# Patient Record
Sex: MALE | Race: WHITE | NOT HISPANIC OR LATINO | Employment: FULL TIME | URBAN - METROPOLITAN AREA
[De-identification: names, ages, dates, MRNs, and addresses within clinical notes are randomized per-mention and may not be internally consistent; named-entity substitution may affect disease eponyms.]

---

## 2019-02-08 ENCOUNTER — TELEPHONE (OUTPATIENT)
Dept: SCHEDULING | Facility: IMAGING CENTER | Age: 59
End: 2019-02-08

## 2019-02-15 ENCOUNTER — OFFICE VISIT (OUTPATIENT)
Dept: URGENT CARE | Facility: CLINIC | Age: 59
End: 2019-02-15
Payer: COMMERCIAL

## 2019-02-15 ENCOUNTER — APPOINTMENT (OUTPATIENT)
Dept: RADIOLOGY | Facility: IMAGING CENTER | Age: 59
End: 2019-02-15
Attending: INTERNAL MEDICINE
Payer: COMMERCIAL

## 2019-02-15 VITALS
SYSTOLIC BLOOD PRESSURE: 136 MMHG | WEIGHT: 260 LBS | HEIGHT: 75 IN | RESPIRATION RATE: 16 BRPM | HEART RATE: 75 BPM | DIASTOLIC BLOOD PRESSURE: 84 MMHG | OXYGEN SATURATION: 95 % | TEMPERATURE: 97.8 F | BODY MASS INDEX: 32.33 KG/M2

## 2019-02-15 DIAGNOSIS — M25.552 ACUTE HIP PAIN, LEFT: ICD-10-CM

## 2019-02-15 DIAGNOSIS — M54.50 LUMBAR BACK PAIN: ICD-10-CM
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
## 2019-02-15 DIAGNOSIS — M89.9 LYTIC BONE LESION OF LEFT FEMUR: ICD-10-CM

## 2019-02-15 PROCEDURE — 99204 OFFICE O/P NEW MOD 45 MIN: CPT | Performed by: INTERNAL MEDICINE

## 2019-02-15 PROCEDURE — 73502 X-RAY EXAM HIP UNI 2-3 VIEWS: CPT | Mod: LT | Performed by: INTERNAL MEDICINE

## 2019-02-15 PROCEDURE — 72100 X-RAY EXAM L-S SPINE 2/3 VWS: CPT | Performed by: INTERNAL MEDICINE

## 2019-02-15 RX ORDER — LORAZEPAM 1 MG/1
1 TABLET ORAL 2 TIMES DAILY PRN
COMMUNITY
End: 2019-03-13

## 2019-02-15 ASSESSMENT — PAIN SCALES - GENERAL: PAINLEVEL: 8=MODERATE-SEVERE PAIN

## 2019-02-15 NOTE — LETTER
February 15, 2019       Patient: Sergio Lakhani   YOB: 1960   Date of Visit: 2/15/2019         To Whom It May Concern:    It is my medical opinion that Sergio Lakhani remain out of work until 2/18/19.    If you have any questions or concerns, please don't hesitate to call [phone]          Sincerely,          Jean-Claude Lopez M.D.  Electronically Signed

## 2019-02-16 ASSESSMENT — ENCOUNTER SYMPTOMS
CONSTITUTIONAL NEGATIVE: 1
COUGH: 0
HEADACHES: 0
FALLS: 1
SHORTNESS OF BREATH: 0
FEVER: 0
BACK PAIN: 1
NAUSEA: 0
EYES NEGATIVE: 1
CHILLS: 0
MYALGIAS: 0
SORE THROAT: 0
BLOOD IN STOOL: 0
DIARRHEA: 0
WEIGHT LOSS: 0
DIZZINESS: 0
VOMITING: 0
PALPITATIONS: 0

## 2019-02-17 NOTE — PROGRESS NOTES
Sergio Lakhani is a 58 y.o. male who presents for Back Pain (fell on ice, pt has arthitis, nerve issues in the lower back, x 3 days )       Patient is a 58-year-old male who presents today after sustaining a fall on the ice.  Patient states he was stepping out of his jeep when he slipped and fell onto his left side.  Patient states he is able to ambulate and bear weight however is extremely sore in his lower back and hip on his left side.  Patient states that he also scraped his left knee.  Patient does state that he has a history of arthritis and has been under the care of a pain management specialist in the past.  Patient denies hitting his head.  Patient denies any other injuries.        PMH:  has no past medical history on file.  MEDS:   Current Outpatient Prescriptions:   •  LISINOPRIL PO, Take  by mouth., Disp: , Rfl:   •  Calcium Carbonate Antacid (TUMS E-X PO), Take  by mouth., Disp: , Rfl:   •  LORazepam (ATIVAN) 1 MG Tab, Take 1 mg by mouth every four hours as needed for Anxiety., Disp: , Rfl:   ALLERGIES: Not on File  SURGHX: No past surgical history on file.  SOCHX:  reports that he has quit smoking. He has never used smokeless tobacco. He reports that he drinks alcohol.  FH: Family history was reviewed, no pertinent findings to report    Review of Systems   Constitutional: Negative.  Negative for chills, fever and weight loss.   HENT: Negative for sore throat.    Eyes: Negative.    Respiratory: Negative for cough and shortness of breath.    Cardiovascular: Negative for chest pain, palpitations and leg swelling.   Gastrointestinal: Negative for blood in stool, diarrhea, nausea and vomiting.   Genitourinary: Negative for dysuria.   Musculoskeletal: Positive for back pain, falls and joint pain. Negative for myalgias.   Skin: Negative for rash.   Neurological: Negative for dizziness (negative headache) and headaches.   All other systems reviewed and are negative.    Not on File   Objective:   /84    "Pulse 75   Temp 36.6 °C (97.8 °F) (Temporal)   Resp 16   Ht 1.905 m (6' 3\")   Wt 117.9 kg (260 lb)   SpO2 95%   BMI 32.50 kg/m²   Physical Exam   Constitutional: He is oriented to person, place, and time. He appears well-developed and well-nourished.   HENT:   Head: Normocephalic and atraumatic.   Right Ear: External ear normal.   Left Ear: External ear normal.   Eyes: Pupils are equal, round, and reactive to light. Conjunctivae are normal.   Neck: Normal range of motion. Neck supple.   Cardiovascular: Normal rate, regular rhythm, normal heart sounds and intact distal pulses.    Pulmonary/Chest: Effort normal.   Musculoskeletal:        Left hip: He exhibits tenderness. He exhibits no bony tenderness and no deformity.        Lumbar back: He exhibits decreased range of motion, tenderness and pain. He exhibits no bony tenderness, no swelling, no edema, no deformity and no spasm.   Neurological: He is alert and oriented to person, place, and time.   Skin: Skin is warm and dry.   Psychiatric: He has a normal mood and affect. His behavior is normal. Judgment and thought content normal.   Nursing note and vitals reviewed.        Assessment/Plan:   Assessment    1. Fall due to slipping on ice or snow, initial encounter    2. Acute hip pain, left  - DX-HIP-UNILATERAL-W/O PELVIS-2/3 VIEWS LEFT; No radiographic evidence of acute traumatic injury.    Apparent scalloping of the inner cortex of the proximal femoral shaft could indicate benign or malignant intramedullary process. Consider further evaluation with bone scan or MRI.    3. Lumbar back pain  - DX-LUMBAR SPINE-2 OR 3 VIEWS; mild degenerative disc disease no acute fracture noted      4. Lytic bone lesion of left femur  - MR-FEMUR-W/O LEFT; Future  Differential diagnosis, natural history, supportive care, and indications for immediate follow-up discussed.    Given the findings on the patient's hip x-ray and apparent scalloping of the femur we will schedule the " patient for a follow-up MRI scan.  Patient is also establishing with a primary care doc and also will need a chronic pain management specialist to take care of his chronic pain needs.  Patient given strict ER precautions for any worsening symptoms.  Patient can use over-the-counter anti-inflammatories if needed.

## 2019-03-13 ENCOUNTER — OFFICE VISIT (OUTPATIENT)
Dept: INTERNAL MEDICINE | Facility: MEDICAL CENTER | Age: 59
End: 2019-03-13
Payer: COMMERCIAL

## 2019-03-13 VITALS
DIASTOLIC BLOOD PRESSURE: 83 MMHG | RESPIRATION RATE: 19 BRPM | WEIGHT: 256.6 LBS | OXYGEN SATURATION: 96 % | TEMPERATURE: 98.2 F | HEIGHT: 73 IN | SYSTOLIC BLOOD PRESSURE: 150 MMHG | HEART RATE: 88 BPM | BODY MASS INDEX: 34.01 KG/M2

## 2019-03-13 DIAGNOSIS — I10 ESSENTIAL HYPERTENSION: ICD-10-CM

## 2019-03-13 DIAGNOSIS — G47.00 INSOMNIA, UNSPECIFIED TYPE: ICD-10-CM

## 2019-03-13 DIAGNOSIS — M54.50 CHRONIC BILATERAL LOW BACK PAIN WITHOUT SCIATICA: ICD-10-CM

## 2019-03-13 DIAGNOSIS — E66.9 OBESITY (BMI 30.0-34.9): ICD-10-CM

## 2019-03-13 DIAGNOSIS — F41.9 ANXIETY: ICD-10-CM

## 2019-03-13 DIAGNOSIS — S93.402A SPRAIN OF LEFT ANKLE, UNSPECIFIED LIGAMENT, INITIAL ENCOUNTER: ICD-10-CM

## 2019-03-13 DIAGNOSIS — J30.9 ALLERGIC RHINITIS, UNSPECIFIED SEASONALITY, UNSPECIFIED TRIGGER: ICD-10-CM

## 2019-03-13 DIAGNOSIS — Z23 NEED FOR TDAP VACCINATION: ICD-10-CM

## 2019-03-13 DIAGNOSIS — G89.29 CHRONIC BILATERAL LOW BACK PAIN WITHOUT SCIATICA: ICD-10-CM

## 2019-03-13 DIAGNOSIS — Z12.11 ENCOUNTER FOR SCREENING COLONOSCOPY: ICD-10-CM

## 2019-03-13 DIAGNOSIS — Z79.899 CHRONIC USE OF BENZODIAZEPINE FOR THERAPEUTIC PURPOSE: ICD-10-CM

## 2019-03-13 DIAGNOSIS — Z80.0 FAMILY HISTORY OF COLON CANCER IN MOTHER: ICD-10-CM

## 2019-03-13 PROCEDURE — 99204 OFFICE O/P NEW MOD 45 MIN: CPT | Mod: GC | Performed by: INTERNAL MEDICINE

## 2019-03-13 RX ORDER — CITALOPRAM 20 MG/1
20 TABLET ORAL DAILY
Qty: 90 TAB | Refills: 1 | Status: SHIPPED | OUTPATIENT
Start: 2019-03-13

## 2019-03-13 RX ORDER — OXYCODONE HYDROCHLORIDE AND ACETAMINOPHEN 5; 325 MG/1; MG/1
1 TABLET ORAL 3 TIMES DAILY
COMMUNITY

## 2019-03-13 RX ORDER — TRAZODONE HYDROCHLORIDE 150 MG/1
150 TABLET ORAL
Qty: 30 TAB | Refills: 2 | Status: SHIPPED | OUTPATIENT
Start: 2019-03-13 | End: 2019-05-19 | Stop reason: SDUPTHER

## 2019-03-13 RX ORDER — CARVEDILOL 3.12 MG/1
3.12 TABLET ORAL 2 TIMES DAILY WITH MEALS
Qty: 60 TAB | Refills: 3 | Status: SHIPPED | OUTPATIENT
Start: 2019-03-13

## 2019-03-13 RX ORDER — AMLODIPINE BESYLATE 10 MG/1
10 TABLET ORAL DAILY
COMMUNITY
End: 2019-03-13 | Stop reason: SDUPTHER

## 2019-03-13 RX ORDER — LISINOPRIL 40 MG/1
40 TABLET ORAL DAILY
Qty: 90 TAB | Refills: 1 | Status: SHIPPED | OUTPATIENT
Start: 2019-03-13

## 2019-03-13 RX ORDER — LORAZEPAM 1 MG/1
1 TABLET ORAL 2 TIMES DAILY PRN
Qty: 60 TAB | Refills: 0 | Status: SHIPPED | OUTPATIENT
Start: 2019-03-13 | End: 2019-04-12

## 2019-03-13 RX ORDER — CITALOPRAM 20 MG/1
20 TABLET ORAL DAILY
COMMUNITY
End: 2019-03-13 | Stop reason: SDUPTHER

## 2019-03-13 RX ORDER — FLUTICASONE PROPIONATE 50 MCG
1 SPRAY, SUSPENSION (ML) NASAL DAILY
COMMUNITY
End: 2019-03-13 | Stop reason: SDUPTHER

## 2019-03-13 RX ORDER — FLUTICASONE PROPIONATE 50 MCG
1 SPRAY, SUSPENSION (ML) NASAL DAILY
Qty: 1 BOTTLE | Refills: 3 | Status: SHIPPED | OUTPATIENT
Start: 2019-03-13

## 2019-03-13 RX ORDER — AMLODIPINE BESYLATE 10 MG/1
10 TABLET ORAL DAILY
Qty: 90 TAB | Refills: 1 | Status: SHIPPED | OUTPATIENT
Start: 2019-03-13

## 2019-03-13 RX ORDER — SENNOSIDES 8.6 MG
650 CAPSULE ORAL 2 TIMES DAILY
Qty: 60 TAB | Refills: 3 | Status: SHIPPED | OUTPATIENT
Start: 2019-03-13

## 2019-03-13 RX ORDER — LISINOPRIL 40 MG/1
40 TABLET ORAL DAILY
COMMUNITY
End: 2019-03-13 | Stop reason: SDUPTHER

## 2019-03-13 RX ORDER — TRAZODONE HYDROCHLORIDE 150 MG/1
150 TABLET ORAL NIGHTLY
COMMUNITY
End: 2019-03-13 | Stop reason: SDUPTHER

## 2019-03-13 ASSESSMENT — PAIN SCALES - GENERAL: PAINLEVEL: 8=MODERATE-SEVERE PAIN

## 2019-03-13 ASSESSMENT — PATIENT HEALTH QUESTIONNAIRE - PHQ9: CLINICAL INTERPRETATION OF PHQ2 SCORE: 0

## 2019-03-13 NOTE — PROGRESS NOTES
New Patient to Establish    Reason to establish: New patient to establish    CC: Medication refill for benzo, oxycodone; recent fall and injury    HPI: Sergio Lakhani is a 58-year-old male with a history of chronic back pain secondary to DJD of lumbar spine on Percocet for the past 4 years prescribed by prior PCP, anxiety disorder on citalopram, Ativan for the past 8-9 years prescribed by prior PCP, primary insomnia on trazodone, who presented for above chief complaint.  He moved from [facility] to [place] last year then moved to [place] about 2 months ago.  Patient had a fall about a month ago on ice, x-ray of lumbar spine and left hip were done, showed disc narrowing with osteophytes at L5-S1 and imaging finding suspicious of benign or malignant intramedullary process of proximal left femoral shaft.  He has been scheduled to have MRI of the left femur by urgent care physician.  He sprained his left ankle about few days ago and is having some pain when he walks.  He is currently having pain in his left side lower back area without any sciatica.    Regarding his anxiety disorder, it started when his little brother and father passed away about 8-9 years ago.  He has been on Ativan 1 mg twice a day as needed in addition to citalopram 20 mg daily.  His last Ativan intake was the night before yesterday.  NARx check indicated he refilled Ativan #50 pills on 2/5/2019 prescribed by prior PCP Carolyn Alvarez Np from [place].  He denies depression, PHQ 2 score 0.  Denies SI/HI.      PCP (Carolyn Alvarez NP) records from [place] reviewed, patient has been on Percocet 5-3 25 every 8 hours as needed for chronic low back pain and citalopram 20 mg daily for depression, Ativan 1 mg 2 times a day as needed for anxiety disorder.  Lisinopril 40 mg daily, amlodipine 10 mg daily, Coreg 6.  2 5 mg daily for hypertension.  Colonoscopy 2014, was advised to repeat one in 5 years due to family history of colon cancer.  Labs (10/5/2017) CMP  within normal except for glucose 100, lipid profile: , , HDL 54, , TSH 2.29, PSA 1.06, CBC within normal.    Patient Active Problem List    Diagnosis Date Noted   • Obesity (BMI 30.0-34.9) 03/13/2019   • Chronic bilateral low back pain without sciatica 03/13/2019   • Family history of colon cancer in mother 03/13/2019   • Anxiety 03/13/2019   • Essential hypertension 03/13/2019   • Chronic use of benzodiazepine for therapeutic purpose 03/13/2019       Past Medical History:   Diagnosis Date   • Anxiety    • Hypertension        Current Outpatient Prescriptions   Medication Sig Dispense Refill   • lisinopril (PRINIVIL, ZESTRIL) 40 MG tablet Take 1 Tab by mouth every day. 90 Tab 1   • citalopram (CELEXA) 20 MG Tab Take 1 Tab by mouth every day. 90 Tab 1   • amLODIPine (NORVASC) 10 MG Tab Take 1 Tab by mouth every day. 90 Tab 1   • fluticasone (FLONASE) 50 MCG/ACT nasal spray Spray 1 Spray in nose every day. 1 Bottle 3   • traZODone (DESYREL) 150 MG Tab Take 1 Tab by mouth every bedtime. 30 Tab 2   • LORazepam (ATIVAN) 1 MG Tab Take 1 Tab by mouth 2 times a day as needed for Anxiety for up to 30 days. 60 Tab 0   • acetaminophen (TYLENOL) 650 MG CR tablet Take 1 Tab by mouth 2 Times a Day. 60 Tab 3   • carvedilol (COREG) 3.125 MG Tab Take 1 Tab by mouth 2 times a day, with meals. 60 Tab 3   • oxyCODONE-acetaminophen (PERCOCET) 5-325 MG Tab Take 1 Tab by mouth 3 times a day.       No current facility-administered medications for this visit.        Allergies as of 03/13/2019   • (No Known Allergies)       Social History     Social History   • Marital status:      Spouse name: N/A   • Number of children: N/A   • Years of education: N/A     Occupational History   • Not on file.     Social History Main Topics   • Smoking status: Former Smoker     Packs/day: 0.50     Years: 5.00     Types: Cigarettes   • Smokeless tobacco: Never Used      Comment: quit at age 28.    • Alcohol use No   • Drug use: No  "  • Sexual activity: Yes     Partners: Female     Other Topics Concern   • Not on file     Social History Narrative   • No narrative on file       Family History   Problem Relation Age of Onset   • Cancer Mother 70        colon cancer   • Cancer Father 78        brain cancer   • No Known Problems Sister    • No Known Problems Brother    • Seizures Brother 45         from seizure       Past Surgical History:   Procedure Laterality Date   • BLADDER BIOPSY WITH CYSTOSCOPY      as part of work up for screening cancer when he lost weight, was found to have thickening of bladder wall, bx wasbenign    • COLONOSCOPY      2 polyps, benign    • TONSILLECTOMY         ROS: As per HPI. Additional pertinent symptoms as noted below:     Patient denies chest pain, dyspnea, orthopnea, PND, edema, cough, fever, chills, nausea / vomiting, abdominal pain, dysuria, changes in appetite or weight, diarrhea / constipation, headache, tingling / numbness sensation, weakness, visual changes.    /83 (BP Location: Left arm, Patient Position: Sitting, BP Cuff Size: Large adult long)   Pulse 88   Temp 36.8 °C (98.2 °F) (Temporal)   Resp 19   Ht 1.86 m (6' 1.23\")   Wt 116.4 kg (256 lb 9.6 oz)   SpO2 96%   BMI 33.64 kg/m²     Physical Exam  General:  Alert and oriented, No apparent distress.    Eyes: Pupils equal and reactive. No scleral icterus.    Throat: Clear no erythema or exudates noted.    Neck: Supple. No lymphadenopathy noted. Thyroid not enlarged.    Lungs: Clear to auscultation and percussion bilaterally.    Cardiovascular: Regular rate and rhythm. No murmurs, rubs or gallops.    Abdomen:  Benign. No rebound or guarding noted.    Extremities: No clubbing, cyanosis, edema.    Skin: Clear. No rash or suspicious skin lesions noted.    Musculoskeletal: Slightly tender over palpation of lower back especially on the left side, mild muscle spasm noted.  Slightly limited range of motion of back to left side due to pain.  " Straight leg raising test negative bilaterally.  Left ankle range of motion slightly limited due to pain.  No edema or inflammation or swelling noted.      Assessment and Plan    Anxiety  - Has been on Ativan 1 mg twice daily as needed with citalopram 20 mg daily for the past 8-9 years.  - Denies SI/HI.  PHQ 2 score 0.  - NARx check revealed last refill on Ativan 2/5/2019 prescribed by prior PCP from [place].   -We will refill his Ativan for 1 month to avoid benzo withdrawal and refer him to psychiatric urgently.  Controlled substance agreement form has been obtained.  Urine drug screen has also been obtained.  He has been told that we will not refill benzo for future and he has to get it refill by psychiatry for long-term.  -     citalopram (CELEXA) 20 MG Tab; Take 1 Tab by mouth every day.  -     TSH WITH REFLEX TO FT4; Future  -     LORazepam (ATIVAN) 1 MG Tab; Take 1 Tab by mouth 2 times a day as needed for Anxiety for up to 30 days.  -     REFERRAL TO PSYCHIATRY      Chronic use of benzodiazepine for therapeutic purpose  - Advised over-the-counter Salonpas pain patch, heat/cold compression, blue emu cream for pain.  -     PAIN MANAGEMENT SCRN, UR; Future  -     LORazepam (ATIVAN) 1 MG Tab; Take 1 Tab by mouth 2 times a day as needed for Anxiety for up to 30 days.  -     REFERRAL TO PSYCHIATRY  -     Controlled Substance Treatment Agreement      Insomnia, unspecified type  - Has been on trazodone 150 mg nightly for the past 8-9 years.  - Trazodone refilled and referral to psychiatric has been placed.  - Advised sleep hygiene.  -     traZODone (DESYREL) 150 MG Tab; Take 1 Tab by mouth every bedtime.  -     TSH WITH REFLEX TO FT4; Future  -     REFERRAL TO PSYCHIATRY      Chronic bilateral low back pain without sciatica  - Chronic low back pain without sciatica, has been on Percocet for the past 4 years prescribed by prior PCP.  - Lumbar spine x-ray showed disc narrowing and osteophytes at L5-S1 interval.   - He  is to take scheduled Tylenol 650 mg 2 times a day and see pain specialist.  - Discussed with patient about physical therapy, he deferred that until he is seen by pain specialist.    -     REFERRAL TO PAIN CLINIC  -     acetaminophen (TYLENOL) 650 MG CR tablet; Take 1 Tab by mouth 2 Times a Day.      Left ankle sprain  - Only slight limitation of movement from pain.  - No obvious fracture, swelling seen.  - Patient declines x-ray of left ankle at this point.  - Recommend to take scheduled Tylenol 650 mg 2 times a day for now.      ?  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  MRI left femur has been ordered by urgent care physician.  -  will follow-up on the result.      Obesity (BMI 30.0-34.9)  - Recommend lifestyle modification including regular exercise.  -     Lipid Profile; Future      Essential hypertension  - Refilled BP medications.  - Recommend DASH diet.  Weight loss with lifestyle modification.  -     lisinopril (PRINIVIL, ZESTRIL) 40 MG tablet; Take 1 Tab by mouth every day.  -     amLODIPine (NORVASC) 10 MG Tab; Take 1 Tab by mouth every day.        -     carvedilol (COREG) 3.125 MG Tab; Take 1 Tab by mouth 2 times a day, with meals.  -     CBC WITH DIFFERENTIAL; Future  -     Comp Metabolic Panel; Future  -     Lipid Profile; Future      Encounter for screening colonoscopy  Family history of colon cancer in mother  - Last colonoscopy in 2014 was told normal, was told to repeat one in 5 years.  Due for one now.  Order has been placed.  -     REFERRAL TO GI FOR COLONOSCOPY      Allergic rhinitis, unspecified seasonality, unspecified trigger  -     fluticasone (FLONASE) 50 MCG/ACT nasal spray; Spray 1 Spray in nose every day.          Followup: Return in about 10 weeks (around 5/22/2019).    Risk Assessment (discuss potential complications a function of chronic problems): Discussed with patient.    Complexity (discuss number of  co-morbidities): Level 5    Signed by: Anny Marion M.D.

## 2019-03-15 ENCOUNTER — HOSPITAL ENCOUNTER (OUTPATIENT)
Dept: RADIOLOGY | Facility: MEDICAL CENTER | Age: 59
End: 2019-03-15
Attending: INTERNAL MEDICINE
Payer: COMMERCIAL

## 2019-03-15 DIAGNOSIS — Z79.899 CHRONIC USE OF BENZODIAZEPINE FOR THERAPEUTIC PURPOSE: ICD-10-CM

## 2019-03-15 DIAGNOSIS — M89.9 LYTIC BONE LESION OF LEFT FEMUR: ICD-10-CM

## 2019-03-15 PROCEDURE — A9585 GADOBUTROL INJECTION: HCPCS | Performed by: INTERNAL MEDICINE

## 2019-03-15 PROCEDURE — 700117 HCHG RX CONTRAST REV CODE 255: Performed by: INTERNAL MEDICINE

## 2019-03-15 PROCEDURE — 73720 MRI LWR EXTREMITY W/O&W/DYE: CPT | Mod: LT

## 2019-03-15 RX ORDER — GADOBUTROL 604.72 MG/ML
12 INJECTION INTRAVENOUS ONCE
Status: COMPLETED | OUTPATIENT
Start: 2019-03-15 | End: 2019-03-15

## 2019-03-15 RX ADMIN — GADOBUTROL 12 ML: 604.72 INJECTION INTRAVENOUS at 10:52

## 2019-03-15 NOTE — PROGRESS NOTES
Urine drug screen ([facility]) from 3/15/2019 consistent with lorazepam, alprazolam and ethanol intake.     ** Alprazolam is not in his prescription.

## 2019-03-20 ENCOUNTER — TELEPHONE (OUTPATIENT)
Dept: INTERNAL MEDICINE | Facility: MEDICAL CENTER | Age: 59
End: 2019-03-20

## 2019-03-21 NOTE — TELEPHONE ENCOUNTER
I tried call patient but no answer. Please call him and let him know that MRI of left femur did not show any evidence of bone lesion or cancer. Please mail him a copy of result if he wants.

## 2019-03-22 NOTE — TELEPHONE ENCOUNTER
Called and spoke pt to inform him about results. Pt agreed for results to be mailed to pts home. Verified pts address and mailed results to pts address.

## 2019-05-19 DIAGNOSIS — G47.00 INSOMNIA, UNSPECIFIED TYPE: ICD-10-CM

## 2019-05-20 RX ORDER — TRAZODONE HYDROCHLORIDE 150 MG/1
TABLET ORAL
Qty: 30 TAB | Refills: 2 | Status: SHIPPED | OUTPATIENT
Start: 2019-05-20

## 2019-05-20 NOTE — TELEPHONE ENCOUNTER
Last seen: 3/13/19 by Dr. Sanam Parra appt: None    Was the patient seen in the last year in this department? Yes   Does patient have an active prescription for medications requested? No   Received Request Via: Pharmacy

## 2021-03-15 DIAGNOSIS — Z23 NEED FOR VACCINATION: ICD-10-CM

## 2024-08-27 ENCOUNTER — OFFICE VISIT (OUTPATIENT)
Dept: FAMILY MEDICINE CLINIC | Age: 64
End: 2024-08-27
Payer: COMMERCIAL

## 2024-08-27 VITALS
BODY MASS INDEX: 34.07 KG/M2 | TEMPERATURE: 97.3 F | OXYGEN SATURATION: 95 % | WEIGHT: 274 LBS | HEART RATE: 54 BPM | HEIGHT: 75 IN | SYSTOLIC BLOOD PRESSURE: 160 MMHG | DIASTOLIC BLOOD PRESSURE: 83 MMHG

## 2024-08-27 DIAGNOSIS — I48.0 PAROXYSMAL A-FIB (HCC): ICD-10-CM

## 2024-08-27 DIAGNOSIS — G89.29 CHRONIC RIGHT-SIDED LOW BACK PAIN WITH RIGHT-SIDED SCIATICA: ICD-10-CM

## 2024-08-27 DIAGNOSIS — G43.109 MIGRAINE WITH AURA AND WITHOUT STATUS MIGRAINOSUS, NOT INTRACTABLE: ICD-10-CM

## 2024-08-27 DIAGNOSIS — I10 PRIMARY HYPERTENSION: ICD-10-CM

## 2024-08-27 DIAGNOSIS — M54.41 CHRONIC RIGHT-SIDED LOW BACK PAIN WITH RIGHT-SIDED SCIATICA: ICD-10-CM

## 2024-08-27 DIAGNOSIS — Z86.73 HISTORY OF STROKE: Primary | ICD-10-CM

## 2024-08-27 PROCEDURE — 99203 OFFICE O/P NEW LOW 30 MIN: CPT

## 2024-08-27 PROCEDURE — 3079F DIAST BP 80-89 MM HG: CPT

## 2024-08-27 PROCEDURE — 3077F SYST BP >= 140 MM HG: CPT

## 2024-08-27 RX ORDER — OXYCODONE AND ACETAMINOPHEN 5; 325 MG/1; MG/1
1 TABLET ORAL EVERY 8 HOURS PRN
Qty: 28 TABLET | Refills: 0 | Status: SHIPPED | OUTPATIENT
Start: 2024-08-27 | End: 2024-09-26

## 2024-08-27 SDOH — ECONOMIC STABILITY: FOOD INSECURITY: WITHIN THE PAST 12 MONTHS, THE FOOD YOU BOUGHT JUST DIDN'T LAST AND YOU DIDN'T HAVE MONEY TO GET MORE.: OFTEN TRUE

## 2024-08-27 SDOH — ECONOMIC STABILITY: INCOME INSECURITY: HOW HARD IS IT FOR YOU TO PAY FOR THE VERY BASICS LIKE FOOD, HOUSING, MEDICAL CARE, AND HEATING?: SOMEWHAT HARD

## 2024-08-27 SDOH — ECONOMIC STABILITY: FOOD INSECURITY: WITHIN THE PAST 12 MONTHS, YOU WORRIED THAT YOUR FOOD WOULD RUN OUT BEFORE YOU GOT MONEY TO BUY MORE.: OFTEN TRUE

## 2024-08-27 ASSESSMENT — ENCOUNTER SYMPTOMS
SHORTNESS OF BREATH: 0
COUGH: 0
WHEEZING: 0
BACK PAIN: 1
ABDOMINAL PAIN: 0

## 2024-08-27 ASSESSMENT — PATIENT HEALTH QUESTIONNAIRE - PHQ9
2. FEELING DOWN, DEPRESSED OR HOPELESS: NOT AT ALL
SUM OF ALL RESPONSES TO PHQ QUESTIONS 1-9: 0
SUM OF ALL RESPONSES TO PHQ9 QUESTIONS 1 & 2: 0
1. LITTLE INTEREST OR PLEASURE IN DOING THINGS: NOT AT ALL

## 2024-08-27 NOTE — PROGRESS NOTES
Visit Information    Have you changed or started any medications since your last visit including any over-the-counter medicines, vitamins, or herbal medicines? no   Have you stopped taking any of your medications? Is so, why? -  no  Are you having any side effects from any of your medications? - no    Have you seen any other physician or provider since your last visit?  no   Have you had any other diagnostic tests since your last visit?  no   Have you been seen in the emergency room and/or had an admission in a hospital since we last saw you?  no   Have you had your routine dental cleaning in the past 6 months?  no     Do you have an active MyChart account? If no, what is the barrier?  No:     Patient Care Team:  Leeann Ramos MD as PCP - General (Family Medicine)    Medical History Review  Past Medical, Family, and Social History reviewed and does not contribute to the patient presenting condition    Health Maintenance   Topic Date Due    Depression Screen  Never done    HIV screen  Never done    Hepatitis C screen  Never done    DTaP/Tdap/Td vaccine (1 - Tdap) Never done    Lipids  Never done    Colorectal Cancer Screen  Never done    Shingles vaccine (1 of 2) Never done    Respiratory Syncytial Virus (RSV) Pregnant or age 60 yrs+ (1 - 1-dose 60+ series) Never done    COVID-19 Vaccine (1 - 2023-24 season) Never done    Flu vaccine (1) Never done    Hepatitis A vaccine  Aged Out    Hepatitis B vaccine  Aged Out    Hib vaccine  Aged Out    Polio vaccine  Aged Out    Meningococcal (ACWY) vaccine  Aged Out    Pneumococcal 0-64 years Vaccine  Aged Out

## 2024-08-27 NOTE — PROGRESS NOTES
Attending Physician Statement  I  have discussed the care of Pedro Griffinovern including pertinent history and exam findings with the resident. I agree with the assessment, plan and orders as documented by the resident.      BP (!) 160/83 (Site: Left Upper Arm, Position: Sitting, Cuff Size: Large Adult)   Pulse 54   Temp 97.3 °F (36.3 °C) (Oral)   Ht 1.905 m (6' 3\")   Wt 124.3 kg (274 lb)   SpO2 95%   BMI 34.25 kg/m²    BP Readings from Last 3 Encounters:   08/27/24 (!) 160/83     Wt Readings from Last 3 Encounters:   08/27/24 124.3 kg (274 lb)          Diagnosis Orders   1. History of stroke        2. Chronic right-sided low back pain with right-sided sciatica  oxyCODONE-acetaminophen (PERCOCET) 5-325 MG per tablet      3. Paroxysmal A-fib (HCC)        4. Migraine with aura and without status migrainosus, not intractable        5. Primary hypertension          [place].     Hx of Afib- CHADVASC 2+ xerlato to be started     Continue BB for afib rate control, follow up with cardiology         Michelle Zendejas DO 8/28/2024 1:20 PM

## 2024-08-27 NOTE — PROGRESS NOTES
[facility] - Outpatient Note      Subjective:    Pedro Whitfield is a 63 y.o. male with  has no past medical history on file.    Presented to the office today for:  Chief Complaint   Patient presents with    Establish Care     Patient state he have a lot of body pain    Headache     Patient states he have a lot headache after his stoke last year        HPI    New patient- moved from [place]   Occupation: not working now    PMHx: stroke 1 year, hypertension, anxiety/depression, chronic back pain, arthritis, sciatica (R sided), asthma, previous a fib, migraines      FMHx: mom  of colon cancer, dad  of brain cancer    Social Hx:   -Drink: has not drank in a couple of years   -Smoke:    -Drugs:    Allergies: none  Medications: albuterol/spiriva/symbicort, bisoprolol, cardizem, percocet for pain, propanolol/ibuprofen for migraines, omeprazole for gerd, lorazepam/trazaodone/buspar/lexapro for depression, montelukast, disulfiram, flomax, docusate, vit D3. Cialis, flonase    A fib: chadvasc score is 3  Previous stroke hx     Review of Systems   Constitutional:  Negative for chills and fever.   HENT:  Negative for congestion.    Respiratory:  Negative for cough, shortness of breath and wheezing.    Cardiovascular:  Negative for chest pain, palpitations and leg swelling.   Gastrointestinal:  Negative for abdominal pain.   Genitourinary:  Negative for dysuria.   Musculoskeletal:  Positive for back pain.   Neurological:  Positive for headaches. Negative for dizziness, weakness, light-headedness and numbness.     The patient has a No family history on file.    Objective:    BP (!) 160/83 (Site: Left Upper Arm, Position: Sitting, Cuff Size: Large Adult)   Pulse 54   Temp 97.3 °F (36.3 °C) (Oral)   Ht 1.905 m (6' 3\")   Wt 124.3 kg (274 lb)   SpO2 95%   BMI 34.25 kg/m²    BP Readings from Last 3 Encounters:   24 (!) 160/83       Physical  regular beta-blocker such as metoprolol.  He will need to follow-up with a cardiologist.    4. Migraine with aura and without status migrainosus, not intractable  Patient will need to be referred to neurology as well.  Continue propranolol and ibuprofen as needed.  Patient will also need to get his kidney function checked.    5.  Primary hypertension  Still increased, his goal should be less than 140/90 especially with his history of stroke.  Will add lisinopril to his regimen once he gets insurance.  Advised patient about ED precautions as needed.      Requested Prescriptions     Signed Prescriptions Disp Refills    oxyCODONE-acetaminophen (PERCOCET) 5-325 MG per tablet 28 tablet 0     Sig: Take 1 tablet by mouth every 8 hours as needed for Pain for up to 30 days. Intended supply: 7 days. Take lowest dose possible to manage pain Max Daily Amount: 3 tablets       There are no discontinued medications.    Pedro received counseling on the following healthy behaviors: nutrition, exercise and medication adherence    Discussed use,benefit, and side effects of prescribed medications.  Barriers to medication compliance addressed.      All patient questions answered.  Pt voiced understanding.     Return in about 4 weeks (around 9/24/2024) for med changes.        Disclaimer: Some orall of this note was transcribed using voice-recognition software.This may cause typographical errors occasionally. Although all effort is made to fix these errors, please do not hesitate to contact our office if there isany concern with the understanding of this note.

## 2024-08-27 NOTE — PATIENT INSTRUCTIONS
[facility] Transportation Resources*  (Call United Way/211 if need more resources)       Area Office on Aging of [facility]  What they offer: Ride search option on their website.  Phone Number: [phone]  [facility], Senior Transportation Program:  What they offer: 2 free rides per month to seniors 65+ in [facility]. Sign up by filling out the form for Senior Rides on their website.  Phone Number: [phone] (5615)  [employer]:  What they offer: Transportation, 4-64 years old $4, 65 and older reduced fare $2 within [facility] limits.  Phone Number: [phone] to schedule (at least one hour in advance); [phone] (General Information)  [facility] Regional Paratransit Service (TARPS) and TARTA Flex:  What they offer: Disability transportation via [facility] Paratransit Service (TARPS) for paratransit registered riders.  Phone Number: [phone] (TARPS)  [employer]:  What they offer: Public transportation $1.50 per ride.  Phone Number: [phone] (5070)  [place] [employer]  What they offer: Transportation services to [place] residents. Services include 2 connecting points to the [place] [employer] system.  Phone Number: Subject to availability, call [phone].  [employer] Non-Emergency Transportation Service  What they offer: Rides to and from Medicaid providers for Medicaid recipients. Must complete forms found on [facility] Website, including assessment completed by medical provider.  Phone Number: For questions call [phone]  [facility] Transit (NCAT):  Jason Steward, Hardeep  What they offer: Transportation 18+, pricing varies starting at $2. Out of county rides require 72-hour notice. Elderly and disabled half-off fare with application.  Phone Number: [phone] (Bin); [phone] ([place]); [phone] ([place])  [facility] Agency on Aging, District 5:  Beck, Hardeep, [place], [place], Sonya, Osbaldo, Joanna, [place], Darci  What         [facility] Food [employer] and [employer] of [place]  Phone number: [phone] Area Housing Resources*  (Call United Way/[facility] if need more resources)      Area Office on Aging of [facility] ([facility]):  What they offer: Senior housing search on website, home repair, and referral to community resources for people ages 60+.  Phone Number: [phone]    [facility] ([place])  What they offer: Assistance with tenants’ rights, eviction, foreclosure.  Phone Number: [phone]   [facility]  [facility]:  What they offer: Reentry Transition Services, including housing and other needs.  Phone Number: [phone]  [facility] ([facility]):  What they offer: Homelessness and housing programs.  Phone Number: [phone]  [facility] Community [facility] ([facility] and [place]):  What they offer: referral to community transportation and other resources.  Phone Number: [phone]  [facility]):  What they offer: Shelter and advocacy services for victims of domestic violence.  Phone Number: [phone] (24/7 line, select option #2).  [facility] ([facility]):  What they offer: Advocacy services for renters and homeowners.  Phone Number: [phone]  [facility]:   What they Offer: Eviction prevention resources and intake for all area emergency shelters.  Phone Number: 211 or 1-320.133.6339    Area Agency on Aging, District 5:    [place], [place], [place], [place], [place], [place], [place], [place], [facility]  What they offer: [place] Housing  search on website and other resources for seniors.  Phone Number: [phone]     [facility]:  What they offer: Ex-Offender [place] [employer]  Phone Number: (840) [phone]  Website: www.Fundbase.T[employer]ora      [employer]UM   First Call For Help  Phone number: [phone]    [place] Community Action ([place], Jason,

## 2024-09-03 ENCOUNTER — TELEPHONE (OUTPATIENT)
Dept: FAMILY MEDICINE CLINIC | Age: 64
End: 2024-09-03

## 2024-09-03 NOTE — TELEPHONE ENCOUNTER
Patient called office asking for medication refills on meds that are not in patient's chart. Patient was seen as a new patient on 08/27. Office has not received his med records yet. Writer offered patient an appt to come in and told him to bring his medications with him. Patient said he will call his old provider in [place] and see if they will refill his medication. Patient has appt on 09/26/24 and he will bring his meds then. If he can't get his old provider to refill his meds, he will call back to schedule a sooner appt.

## 2024-09-10 ENCOUNTER — TELEPHONE (OUTPATIENT)
Dept: FAMILY MEDICINE CLINIC | Age: 64
End: 2024-09-10

## 2024-09-13 DIAGNOSIS — M54.41 CHRONIC RIGHT-SIDED LOW BACK PAIN WITH RIGHT-SIDED SCIATICA: ICD-10-CM

## 2024-09-13 DIAGNOSIS — M54.41 CHRONIC RIGHT-SIDED LOW BACK PAIN WITH RIGHT-SIDED SCIATICA: Primary | ICD-10-CM

## 2024-09-13 DIAGNOSIS — G89.29 CHRONIC RIGHT-SIDED LOW BACK PAIN WITH RIGHT-SIDED SCIATICA: Primary | ICD-10-CM

## 2024-09-13 DIAGNOSIS — G89.29 CHRONIC RIGHT-SIDED LOW BACK PAIN WITH RIGHT-SIDED SCIATICA: ICD-10-CM

## 2024-09-13 RX ORDER — OXYCODONE AND ACETAMINOPHEN 5; 325 MG/1; MG/1
1 TABLET ORAL EVERY 8 HOURS PRN
Qty: 28 TABLET | Refills: 0 | Status: SHIPPED | OUTPATIENT
Start: 2024-09-13 | End: 2024-10-13

## 2024-09-16 ENCOUNTER — TELEPHONE (OUTPATIENT)
Dept: FAMILY MEDICINE CLINIC | Age: 64
End: 2024-09-16

## 2024-09-17 DIAGNOSIS — G89.29 CHRONIC RIGHT-SIDED LOW BACK PAIN WITH RIGHT-SIDED SCIATICA: ICD-10-CM

## 2024-09-17 DIAGNOSIS — M54.41 CHRONIC RIGHT-SIDED LOW BACK PAIN WITH RIGHT-SIDED SCIATICA: ICD-10-CM

## 2024-09-18 DIAGNOSIS — M54.41 CHRONIC RIGHT-SIDED LOW BACK PAIN WITH RIGHT-SIDED SCIATICA: ICD-10-CM

## 2024-09-18 DIAGNOSIS — G89.29 CHRONIC RIGHT-SIDED LOW BACK PAIN WITH RIGHT-SIDED SCIATICA: ICD-10-CM

## 2024-09-18 RX ORDER — OXYCODONE AND ACETAMINOPHEN 5; 325 MG/1; MG/1
1 TABLET ORAL EVERY 8 HOURS PRN
Qty: 28 TABLET | Refills: 0 | OUTPATIENT
Start: 2024-09-18 | End: 2024-10-18

## 2024-09-18 RX ORDER — OXYCODONE AND ACETAMINOPHEN 5; 325 MG/1; MG/1
1 TABLET ORAL EVERY 8 HOURS PRN
Qty: 28 TABLET | Refills: 0 | Status: SHIPPED | OUTPATIENT
Start: 2024-09-18 | End: 2024-10-18

## 2024-09-26 ENCOUNTER — OFFICE VISIT (OUTPATIENT)
Dept: FAMILY MEDICINE CLINIC | Age: 64
End: 2024-09-26
Payer: COMMERCIAL

## 2024-09-26 VITALS
DIASTOLIC BLOOD PRESSURE: 86 MMHG | SYSTOLIC BLOOD PRESSURE: 150 MMHG | HEIGHT: 75 IN | HEART RATE: 43 BPM | BODY MASS INDEX: 34.19 KG/M2 | WEIGHT: 275 LBS

## 2024-09-26 DIAGNOSIS — G89.29 CHRONIC RIGHT-SIDED LOW BACK PAIN WITH RIGHT-SIDED SCIATICA: ICD-10-CM

## 2024-09-26 DIAGNOSIS — G43.109 MIGRAINE WITH AURA AND WITHOUT STATUS MIGRAINOSUS, NOT INTRACTABLE: ICD-10-CM

## 2024-09-26 DIAGNOSIS — Z86.73 HISTORY OF STROKE: ICD-10-CM

## 2024-09-26 DIAGNOSIS — Z13.1 SCREENING FOR DIABETES MELLITUS: ICD-10-CM

## 2024-09-26 DIAGNOSIS — I48.0 PAROXYSMAL A-FIB (HCC): ICD-10-CM

## 2024-09-26 DIAGNOSIS — M54.41 CHRONIC RIGHT-SIDED LOW BACK PAIN WITH RIGHT-SIDED SCIATICA: ICD-10-CM

## 2024-09-26 DIAGNOSIS — Z11.4 SCREENING FOR HIV WITHOUT PRESENCE OF RISK FACTORS: ICD-10-CM

## 2024-09-26 DIAGNOSIS — I10 PRIMARY HYPERTENSION: Primary | ICD-10-CM

## 2024-09-26 PROCEDURE — G0008 ADMIN INFLUENZA VIRUS VAC: HCPCS

## 2024-09-26 PROCEDURE — 3077F SYST BP >= 140 MM HG: CPT

## 2024-09-26 PROCEDURE — 3079F DIAST BP 80-89 MM HG: CPT

## 2024-09-26 PROCEDURE — 99213 OFFICE O/P EST LOW 20 MIN: CPT

## 2024-09-26 PROCEDURE — 90677 PCV20 VACCINE IM: CPT | Performed by: FAMILY MEDICINE

## 2024-09-26 RX ORDER — FREMANEZUMAB-VFRM 225 MG/1.5ML
INJECTION SUBCUTANEOUS
COMMUNITY
Start: 2024-08-06

## 2024-09-26 RX ORDER — KETOROLAC TROMETHAMINE 30 MG/ML
30 INJECTION, SOLUTION INTRAMUSCULAR; INTRAVENOUS ONCE
Status: COMPLETED | OUTPATIENT
Start: 2024-09-26 | End: 2024-09-26

## 2024-09-26 RX ORDER — ASPIRIN 81 MG/1
81 TABLET ORAL DAILY
Qty: 90 TABLET | Refills: 0 | Status: SHIPPED | OUTPATIENT
Start: 2024-09-26

## 2024-09-26 RX ORDER — DILTIAZEM HYDROCHLORIDE 120 MG/1
120 CAPSULE, COATED, EXTENDED RELEASE ORAL DAILY
Qty: 30 CAPSULE | Refills: 3 | Status: SHIPPED | OUTPATIENT
Start: 2024-09-26

## 2024-09-26 RX ORDER — OXYCODONE AND ACETAMINOPHEN 5; 325 MG/1; MG/1
1 TABLET ORAL EVERY 8 HOURS PRN
Qty: 28 TABLET | Refills: 0 | Status: SHIPPED | OUTPATIENT
Start: 2024-09-26 | End: 2024-10-26

## 2024-09-26 RX ORDER — ATORVASTATIN CALCIUM 40 MG/1
40 TABLET, FILM COATED ORAL DAILY
Qty: 30 TABLET | Refills: 3 | Status: SHIPPED | OUTPATIENT
Start: 2024-09-26

## 2024-09-26 RX ADMIN — KETOROLAC TROMETHAMINE 30 MG: 30 INJECTION, SOLUTION INTRAMUSCULAR; INTRAVENOUS at 12:34

## 2024-09-26 ASSESSMENT — PATIENT HEALTH QUESTIONNAIRE - PHQ9
SUM OF ALL RESPONSES TO PHQ QUESTIONS 1-9: 0
SUM OF ALL RESPONSES TO PHQ QUESTIONS 1-9: 0
1. LITTLE INTEREST OR PLEASURE IN DOING THINGS: NOT AT ALL
SUM OF ALL RESPONSES TO PHQ QUESTIONS 1-9: 0
SUM OF ALL RESPONSES TO PHQ QUESTIONS 1-9: 0
SUM OF ALL RESPONSES TO PHQ9 QUESTIONS 1 & 2: 0
2. FEELING DOWN, DEPRESSED OR HOPELESS: NOT AT ALL

## 2024-09-26 ASSESSMENT — ENCOUNTER SYMPTOMS
WHEEZING: 0
ABDOMINAL PAIN: 0
SHORTNESS OF BREATH: 0
COUGH: 0
BACK PAIN: 1

## 2024-09-27 ENCOUNTER — TELEPHONE (OUTPATIENT)
Dept: FAMILY MEDICINE CLINIC | Age: 64
End: 2024-09-27

## 2024-09-27 DIAGNOSIS — J30.2 SEASONAL ALLERGIES: Primary | ICD-10-CM

## 2024-09-27 RX ORDER — KETOTIFEN FUMARATE 0.35 MG/ML
1 SOLUTION/ DROPS OPHTHALMIC 2 TIMES DAILY
Qty: 10 ML | Refills: 0 | Status: SHIPPED | OUTPATIENT
Start: 2024-09-27 | End: 2025-01-05

## 2024-09-27 NOTE — TELEPHONE ENCOUNTER
Patient called the office stating he spoke with physician and was told to call the office with the name of the medication that works for him, medication is Zaditor, patient is also requesting Gabapentin to be sent to the pharmacy as well.

## 2024-10-07 DIAGNOSIS — M54.41 CHRONIC RIGHT-SIDED LOW BACK PAIN WITH RIGHT-SIDED SCIATICA: ICD-10-CM

## 2024-10-07 DIAGNOSIS — G89.29 CHRONIC RIGHT-SIDED LOW BACK PAIN WITH RIGHT-SIDED SCIATICA: ICD-10-CM

## 2024-10-07 RX ORDER — OXYCODONE AND ACETAMINOPHEN 5; 325 MG/1; MG/1
1 TABLET ORAL EVERY 8 HOURS PRN
Qty: 28 TABLET | Refills: 0 | Status: SHIPPED | OUTPATIENT
Start: 2024-10-07 | End: 2024-11-06

## 2024-10-07 NOTE — TELEPHONE ENCOUNTER
Please address the medication refill and close the encounter.  If I can be of assistance, please route to the applicable pool.      Thank you.      Last visit: 9-26-24  Last Med refill: 9-  Does patient have enough medication for 72 hours: No:     Next Visit Date:  Future Appointments   Date Time Provider Department Center   10/18/2024 10:30 AM Vasyl Jean-Baptiste DO [facility]   12/13/2024 11:00 AM Leeann Ramos MD [facility]   3/7/2025  1:45 PM [facility]       Health Maintenance   Topic Date Due    Lipids  Never done    HIV screen  Never done    Hepatitis C screen  Never done    DTaP/Tdap/Td vaccine (1 - Tdap) Never done    Diabetes screen  Never done    Colorectal Cancer Screen  Never done    Shingles vaccine (1 of 2) Never done    Respiratory Syncytial Virus (RSV) Pregnant or age 60 yrs+ (1 - 1-dose 60+ series) Never done    COVID-19 Vaccine (1 - 2023-24 season) Never done    Depression Screen  09/26/2025    Flu vaccine  Completed    Hepatitis A vaccine  Aged Out    Hepatitis B vaccine  Aged Out    Hib vaccine  Aged Out    Polio vaccine  Aged Out    Meningococcal (ACWY) vaccine  Aged Out    Pneumococcal 0-64 years Vaccine  Aged Out       No results found for: \"LABA1C\"          ( goal A1C is < 7)   No components found for: \"LABMICR\"  No components found for: \"LDLCHOLESTEROL\", \"LDLCALC\"    (goal LDL is <100)   No results found for: \"AST\", \"ALT\", \"BUN\", \"CR\"  BP Readings from Last 3 Encounters:   09/26/24 (!) 150/86   08/27/24 (!) 160/83          (goal 120/80)    All Future Testing planned in [facility]  Lab Frequency Next Occurrence   Lipid, Fasting Once 09/26/2024   Hemoglobin A1C Once 09/26/2024   Comprehensive Metabolic Panel Once 09/26/2024   HIV Screen Once 09/26/2024               Patient Active Problem List:     History of stroke     Chronic right-sided low back pain with right-sided sciatica     Paroxysmal A-fib (HCC)     Migraine with aura and

## 2024-10-14 ENCOUNTER — TELEPHONE (OUTPATIENT)
Dept: FAMILY MEDICINE CLINIC | Age: 64
End: 2024-10-14

## 2024-10-18 ENCOUNTER — INITIAL CONSULT (OUTPATIENT)
Dept: PAIN MANAGEMENT | Age: 64
End: 2024-10-18

## 2024-10-18 ENCOUNTER — HOSPITAL ENCOUNTER (OUTPATIENT)
Age: 64
Setting detail: SPECIMEN
Discharge: HOME | End: 2024-10-18

## 2024-10-18 VITALS — WEIGHT: 275 LBS | BODY MASS INDEX: 34.19 KG/M2 | HEIGHT: 75 IN

## 2024-10-18 DIAGNOSIS — M54.41 CHRONIC RIGHT-SIDED LOW BACK PAIN WITH RIGHT-SIDED SCIATICA: ICD-10-CM

## 2024-10-18 DIAGNOSIS — M54.16 LUMBAR RADICULOPATHY: ICD-10-CM

## 2024-10-18 DIAGNOSIS — G89.29 CHRONIC RIGHT-SIDED LOW BACK PAIN WITH RIGHT-SIDED SCIATICA: ICD-10-CM

## 2024-10-18 DIAGNOSIS — M79.18 MYOFASCIAL PAIN SYNDROME: ICD-10-CM

## 2024-10-18 DIAGNOSIS — Z79.891 CHRONIC USE OF OPIATE FOR THERAPEUTIC PURPOSE: Primary | ICD-10-CM

## 2024-10-18 DIAGNOSIS — E66.811 CLASS 1 OBESITY WITH BODY MASS INDEX (BMI) OF 34.0 TO 34.9 IN ADULT, UNSPECIFIED OBESITY TYPE, UNSPECIFIED WHETHER SERIOUS COMORBIDITY PRESENT: ICD-10-CM

## 2024-10-18 DIAGNOSIS — Z79.891 CHRONIC USE OF OPIATE FOR THERAPEUTIC PURPOSE: ICD-10-CM

## 2024-10-18 NOTE — TELEPHONE ENCOUNTER
Patient said he went to pain management today and was told for the next month he needs to get his pain meds through PCP, and at his next appt with them, they can start his pain meds. If they get his records sooner, they can take over sooner.

## 2024-10-18 NOTE — PROGRESS NOTES
Chronic Pain Clinic Note     Encounter Date: 10/18/2024     SUBJECTIVE:  Chief Complaint   Patient presents with    New Patient     Back pain       History of Present Illness:   Pedro Whitfield is a 64 y.o. male who presents with back pain    Medication Refill: n/a    Current Complaints of Pain:   Location: back   Radiation: Right leg   Severity: Moderate  Pain Numerical Score - 7/8 today    Average: 4/5 with Percocet     Highest:  9  Lowest: 4  Character/Quality: Complains of pain that is aching, burning, throbbing   Timing: Constant  Associated symptoms: none  Numbness: no  Weakness: no  Exacerbating factors: pain is just there  Alleviating factors:  sleeping & meds   Length of time pain has been present: Started about 7 years ago   Inciting event/injury: no  Bowel/Bladder incontinence: no  Falls: no  Physical Therapy: no    History of Interventions:   Surgery: No previous lumbar/cervical surgeries  [place] - last one was about 06/16/2024    Imaging:    None on file     History reviewed. No pertinent past medical history.    History reviewed. No pertinent surgical history.    History reviewed. No pertinent family history.    Social History     Socioeconomic History    Marital status: Unknown     Spouse name: Not on file    Number of children: Not on file    Years of education: Not on file    Highest education level: Not on file   Occupational History    Not on file   Tobacco Use    Smoking status: Never     Passive exposure: Never    Smokeless tobacco: Never   Substance and Sexual Activity    Alcohol use: Not on file    Drug use: Not on file    Sexual activity: Not on file   Other Topics Concern    Not on file   Social History Narrative    Not on file     Social Determinants of Health     Financial Resource Strain: Medium Risk (8/27/2024)    Overall Financial Resource Strain (CARDIA)     Difficulty of Paying Living Expenses: Somewhat hard   Food Insecurity: Food Insecurity Present

## 2024-10-19 LAB
6-ACETYLMORPHINE, UR: NORMAL
7-AMINOCLONAZEPAM, URINE: NORMAL
ALPHA-OH-ALPRAZ, URINE: NORMAL
ALPHA-OH-MIDAZOLAM, URINE: NORMAL
ALPRAZOLAM, URINE: NORMAL
AMPHETAMINE, URINE: NORMAL
BARBITURATES, URINE: NORMAL
BENZOYLECGONINE, UR: NORMAL
BUPRENORPHINE URINE: NORMAL
CARISOPRODOL, UR: NORMAL
CLONAZEPAM, URINE: NORMAL
CODEINE, URINE: NORMAL
CREAT UR-MCNC: NORMAL MG/DL
DIAZEPAM, URINE: NORMAL
DRUGS EXPECTED, UR: NORMAL
EER HI RES INTERP UR: NORMAL
ETHYL GLUCURONIDE UR: NORMAL
FENTANYL URINE: NORMAL
GABAPENTIN: NORMAL
HYDROCODONE, URINE: NORMAL
HYDROMORPHONE, URINE: NORMAL
LORAZEPAM, URINE: NORMAL
MARIJUANA METAB, UR: NORMAL
MDA, URINE: NORMAL
MDEA, EVE, UR: NORMAL
MDMA, URINE: NORMAL
MEPERIDINE METAB, UR: NORMAL
METHADONE, URINE: NORMAL
METHAMPHETAMINE, URINE: NORMAL
METHYLPHENIDATE: NORMAL
MIDAZOLAM, URINE: NORMAL
MORPHINE, OPI1M: NORMAL
NALOXONE URINE: NORMAL
NORBUPRENORPHINE, URINE: NORMAL
NORDIAZEPAM, URINE: NORMAL
NORFENTANYL, URINE: NORMAL
NORHYDROCODONE, URINE: NORMAL
NOROXYCODONE, URINE: NORMAL
NOROXYMORPHONE, URINE: NORMAL
OXAZEPAM, URINE: NORMAL
OXYCODONE URINE: NORMAL
OXYMORPHONE, URINE: NORMAL
PAIN MANAGEMENT DRUG PANEL INTERP, URINE: NORMAL
PAIN MGT DRUG PANEL, HI RES, UR: NORMAL
PCP,URINE: NORMAL
PHENTERMINE, UR: NORMAL
PREGABALIN: NORMAL
TAPENTADOL, URINE: NORMAL
TAPENTADOL-O-SULFATE, URINE: NORMAL
TEMAZEPAM, URINE: NORMAL
TRAMADOL, URINE: NORMAL
ZOLPIDEM METABOLITE (ZCA), URINE: NORMAL
ZOLPIDEM, URINE: NORMAL

## 2024-10-21 DIAGNOSIS — G89.29 CHRONIC RIGHT-SIDED LOW BACK PAIN WITH RIGHT-SIDED SCIATICA: ICD-10-CM

## 2024-10-21 DIAGNOSIS — M54.41 CHRONIC RIGHT-SIDED LOW BACK PAIN WITH RIGHT-SIDED SCIATICA: ICD-10-CM

## 2024-10-21 RX ORDER — OXYCODONE AND ACETAMINOPHEN 5; 325 MG/1; MG/1
1 TABLET ORAL EVERY 8 HOURS PRN
Qty: 28 TABLET | Refills: 0 | OUTPATIENT
Start: 2024-10-21 | End: 2024-11-20

## 2024-10-21 NOTE — TELEPHONE ENCOUNTER
Refill Request of Percocet received from patient, Pharmacy Confirmed. Medication Pended. Pt last seen on 9/26/24, Next appt is 12/13/24.    Health Maintenance   Topic Date Due    Lipids  Never done    HIV screen  Never done    Hepatitis C screen  Never done    DTaP/Tdap/Td vaccine (1 - Tdap) Never done    Diabetes screen  Never done    Colorectal Cancer Screen  Never done    Shingles vaccine (1 of 2) Never done    Respiratory Syncytial Virus (RSV) Pregnant or age 60 yrs+ (1 - 1-dose 60+ series) Never done    COVID-19 Vaccine (1 - 2023-24 season) Never done    Depression Screen  09/26/2025    Flu vaccine  Completed    Hepatitis A vaccine  Aged Out    Hepatitis B vaccine  Aged Out    Hib vaccine  Aged Out    Polio vaccine  Aged Out    Meningococcal (ACWY) vaccine  Aged Out    Pneumococcal 0-64 years Vaccine  Aged Out       No results found for: \"LABA1C\"          ( goal A1C is < 7)   No components found for: \"LABMICR\"  No components found for: \"LDLCHOLESTEROL\", \"LDLCALC\"    (goal LDL is <100)   No results found for: \"AST\", \"ALT\", \"BUN\", \"CR\"  BP Readings from Last 3 Encounters:   09/26/24 (!) 150/86   08/27/24 (!) 160/83          (goal 120/80)    All Future Testing planned in [facility]  Lab Frequency Next Occurrence   Lipid, Fasting Once 09/26/2024   Hemoglobin A1C Once 09/26/2024   Comprehensive Metabolic Panel Once 09/26/2024   HIV Screen Once 09/26/2024       Next Visit Date:  Future Appointments   Date Time Provider Department Center   12/13/2024 11:00 AM Leeann Ramos MD [facility]   12/30/2024 10:00 AM Gaby Romero PA Neuro Spec Neurology -   3/7/2025  1:45 PM Mal Barksdale MD [facility]            Patient Active Problem List:     History of stroke     Chronic right-sided low back pain with right-sided sciatica     Paroxysmal A-fib (HCC)     Migraine with aura and without status migrainosus, not intractable     Primary hypertension

## 2024-10-23 ENCOUNTER — TELEPHONE (OUTPATIENT)
Dept: FAMILY MEDICINE CLINIC | Age: 64
End: 2024-10-23

## 2024-10-23 LAB
6-ACETYLMORPHINE, UR: NOT DETECTED
7-AMINOCLONAZEPAM, URINE: NOT DETECTED
ALPHA-OH-ALPRAZ, URINE: NOT DETECTED
ALPHA-OH-MIDAZOLAM, URINE: NOT DETECTED
ALPRAZOLAM, URINE: NOT DETECTED
AMPHETAMINE, URINE: NOT DETECTED
BARBITURATES, URINE: NEGATIVE
BENZOYLECGONINE, UR: NEGATIVE
BUPRENORPHINE URINE: NOT DETECTED
CARISOPRODOL, UR: NEGATIVE
CLONAZEPAM, URINE: NOT DETECTED
CODEINE, URINE: NOT DETECTED
CREAT UR-MCNC: 139.3 MG/DL (ref 20–400)
DIAZEPAM, URINE: NOT DETECTED
DRUGS EXPECTED, UR: NORMAL
EER HI RES INTERP UR: NORMAL
ETHYL GLUCURONIDE UR: NEGATIVE
FENTANYL URINE: NOT DETECTED
GABAPENTIN: NOT DETECTED
HYDROCODONE, URINE: NOT DETECTED
HYDROMORPHONE, URINE: NOT DETECTED
LORAZEPAM, URINE: PRESENT
MARIJUANA METAB, UR: NEGATIVE
MDA, URINE: NOT DETECTED
MDEA, EVE, UR: NOT DETECTED
MDMA, URINE: NOT DETECTED
MEPERIDINE METAB, UR: NOT DETECTED
METHADONE, URINE: NEGATIVE
METHAMPHETAMINE, URINE: NOT DETECTED
METHYLPHENIDATE: NOT DETECTED
MIDAZOLAM, URINE: NOT DETECTED
MORPHINE, OPI1M: NOT DETECTED
NALOXONE URINE: NOT DETECTED
NORBUPRENORPHINE, URINE: NOT DETECTED
NORDIAZEPAM, URINE: NOT DETECTED
NORFENTANYL, URINE: NOT DETECTED
NORHYDROCODONE, URINE: NOT DETECTED
NOROXYCODONE, URINE: PRESENT
NOROXYMORPHONE, URINE: NOT DETECTED
OXAZEPAM, URINE: NOT DETECTED
OXYCODONE URINE: PRESENT
OXYMORPHONE, URINE: PRESENT
PAIN MANAGEMENT DRUG PANEL INTERP, URINE: NORMAL
PAIN MGT DRUG PANEL, HI RES, UR: NORMAL
PCP,URINE: NEGATIVE
PHENTERMINE, UR: NOT DETECTED
PREGABALIN: NOT DETECTED
TAPENTADOL, URINE: NOT DETECTED
TAPENTADOL-O-SULFATE, URINE: NOT DETECTED
TEMAZEPAM, URINE: NOT DETECTED
TRAMADOL, URINE: NEGATIVE
ZOLPIDEM METABOLITE (ZCA), URINE: NOT DETECTED
ZOLPIDEM, URINE: NOT DETECTED

## 2024-10-23 NOTE — TELEPHONE ENCOUNTER
Patient called asking about his pain medication - Oxycodone - acetaminophen 5-325 mg . Patient was informed that medication was denied due to it intended  for a 7 day supply until patient sees pain management.  Call ended

## 2024-11-05 ENCOUNTER — TELEPHONE (OUTPATIENT)
Dept: PAIN MANAGEMENT | Age: 64
End: 2024-11-05

## 2024-11-05 NOTE — TELEPHONE ENCOUNTER
Pt calling today asking for the results of his drug screen and to see if the requested records have been received so that he can schedule his next appt?    I do [facility] . Phone number: [phone].     Please advise.

## 2024-11-05 NOTE — TELEPHONE ENCOUNTER
Im not sure that anything was sent to previous pain dr, asked for loretta and caroline help with that, maybe its not scanned yet. Pt has no appt scheduled with you yet.

## 2024-11-06 DIAGNOSIS — J30.2 SEASONAL ALLERGIES: ICD-10-CM

## 2024-11-06 RX ORDER — KETOTIFEN FUMARATE 0.35 MG/ML
1 SOLUTION/ DROPS OPHTHALMIC 2 TIMES DAILY
Qty: 10 ML | Refills: 0 | Status: SHIPPED | OUTPATIENT
Start: 2024-11-06 | End: 2025-02-14

## 2024-11-06 NOTE — TELEPHONE ENCOUNTER
Please address the medication refill and close the encounter.  If I can be of assistance, please route to the applicable pool.      Thank you.    Last visit: 9-  Last Med refill: 9-27-24  Does patient have enough medication for 72 hours: No:     Next Visit Date:  Future Appointments   Date Time Provider Department Center   12/13/2024 11:00 AM [facility]   12/30/2024 10:00 AM Gaby Romero PA Neuro Spec Neurology -   3/7/2025  1:45 PM Mal Barksdale MD [facility]       Health Maintenance   Topic Date Due    Lipids  Never done    HIV screen  Never done    Hepatitis C screen  Never done    DTaP/Tdap/Td vaccine (1 - Tdap) Never done    Diabetes screen  Never done    Colorectal Cancer Screen  Never done    Shingles vaccine (1 of 2) Never done    Respiratory Syncytial Virus (RSV) Pregnant or age 60 yrs+ (1 - 1-dose 60+ series) Never done    COVID-19 Vaccine (1 - 2023-24 season) Never done    Depression Screen  09/26/2025    Flu vaccine  Completed    Hepatitis A vaccine  Aged Out    Hepatitis B vaccine  Aged Out    Hib vaccine  Aged Out    Polio vaccine  Aged Out    Meningococcal (ACWY) vaccine  Aged Out    Pneumococcal 0-64 years Vaccine  Aged Out       No results found for: \"LABA1C\"          ( goal A1C is < 7)   No components found for: \"LABMICR\"  No components found for: \"LDLCHOLESTEROL\", \"LDLCALC\"    (goal LDL is <100)   No results found for: \"AST\", \"ALT\", \"BUN\", \"CR\"  BP Readings from Last 3 Encounters:   09/26/24 (!) 150/86   08/27/24 (!) 160/83          (goal 120/80)    All Future Testing planned in [facility]  Lab Frequency Next Occurrence   Lipid, Fasting Once 09/26/2024   Hemoglobin A1C Once 09/26/2024   Comprehensive Metabolic Panel Once 09/26/2024   HIV Screen Once 09/26/2024               Patient Active Problem List:     History of stroke     Chronic right-sided low back pain with right-sided sciatica     Paroxysmal A-fib (HCC)     Migraine with aura

## 2024-11-26 ENCOUNTER — TELEPHONE (OUTPATIENT)
Dept: FAMILY MEDICINE CLINIC | Age: 64
End: 2024-11-26

## 2024-11-26 NOTE — TELEPHONE ENCOUNTER
AONEVA called to see if Dr. Ramos has signed the CMN for care. Writer informed them it still in providers mail box to be sign.

## 2024-12-26 DIAGNOSIS — J30.2 SEASONAL ALLERGIES: ICD-10-CM

## 2024-12-27 DIAGNOSIS — I10 PRIMARY HYPERTENSION: ICD-10-CM

## 2024-12-27 DIAGNOSIS — I48.0 PAROXYSMAL A-FIB (HCC): ICD-10-CM

## 2024-12-27 RX ORDER — DILTIAZEM HYDROCHLORIDE 120 MG/1
120 CAPSULE, COATED, EXTENDED RELEASE ORAL DAILY
Qty: 30 CAPSULE | Refills: 3 | Status: SHIPPED | OUTPATIENT
Start: 2024-12-27

## 2024-12-27 RX ORDER — KETOTIFEN FUMARATE 0.35 MG/ML
SOLUTION/ DROPS OPHTHALMIC
Qty: 10 ML | Refills: 0 | Status: SHIPPED | OUTPATIENT
Start: 2024-12-27

## 2024-12-27 NOTE — TELEPHONE ENCOUNTER
Last visit: 9/26/24  Last Med refill: 11/6/24  Does patient have enough medication for 72 hours: no    Next Visit Date:  Future Appointments   Date Time Provider Department Center   12/30/2024  8:45 AM Vasyl Jean-Baptiste DO [facility]   12/30/2024 10:00 AM Gaby Romero PA Neuro Spec Neurology -   3/7/2025  1:45 PM Mal Barksdale MD [facility]       Health Maintenance   Topic Date Due    Lipids  Never done    HIV screen  Never done    Hepatitis C screen  Never done    DTaP/Tdap/Td vaccine (1 - Tdap) Never done    Diabetes screen  Never done    Colorectal Cancer Screen  Never done    Shingles vaccine (1 of 2) Never done    Respiratory Syncytial Virus (RSV) Pregnant or age 60 yrs+ (1 - Risk 60-74 years 1-dose series) Never done    COVID-19 Vaccine (1 - 2023-24 season) Never done    Depression Screen  09/26/2025    Flu vaccine  Completed    Hepatitis A vaccine  Aged Out    Hepatitis B vaccine  Aged Out    Hib vaccine  Aged Out    Polio vaccine  Aged Out    Meningococcal (ACWY) vaccine  Aged Out    Pneumococcal 0-64 years Vaccine  Aged Out       No results found for: \"LABA1C\"          ( goal A1C is < 7)   No components found for: \"LABMICR\"  No components found for: \"LDLCHOLESTEROL\", \"LDLCALC\"    (goal LDL is <100)   No results found for: \"AST\", \"ALT\", \"BUN\", \"CR\"  BP Readings from Last 3 Encounters:   09/26/24 (!) 150/86   08/27/24 (!) 160/83          (goal 120/80)    All Future Testing planned in [facility]  Lab Frequency Next Occurrence   Lipid, Fasting Once 09/26/2024   Hemoglobin A1C Once 09/26/2024   Comprehensive Metabolic Panel Once 09/26/2024   HIV Screen Once 09/26/2024               Patient Active Problem List:     History of stroke     Chronic right-sided low back pain with right-sided sciatica     Paroxysmal A-fib (HCC)     Migraine with aura and without status migrainosus, not intractable     Primary hypertension

## 2024-12-27 NOTE — TELEPHONE ENCOUNTER
Last visit: 09/26/2024  Last Med refill: 09/26/2024  Does patient have enough medication for 72 hours: No:     Next Visit Date:  Future Appointments   Date Time Provider Department Center   12/30/2024  8:45 AM Vasyl Jean-Baptiste DO [facility]   12/30/2024 10:00 AM Gaby Romero PA Neuro Spec Neurology -   3/7/2025  1:45 PM Mal Barksdale MD [facility]       Health Maintenance   Topic Date Due    Lipids  Never done    HIV screen  Never done    Hepatitis C screen  Never done    DTaP/Tdap/Td vaccine (1 - Tdap) Never done    Diabetes screen  Never done    Colorectal Cancer Screen  Never done    Shingles vaccine (1 of 2) Never done    Respiratory Syncytial Virus (RSV) Pregnant or age 60 yrs+ (1 - Risk 60-74 years 1-dose series) Never done    COVID-19 Vaccine (1 - 2023-24 season) Never done    Depression Screen  09/26/2025    Flu vaccine  Completed    Hepatitis A vaccine  Aged Out    Hepatitis B vaccine  Aged Out    Hib vaccine  Aged Out    Polio vaccine  Aged Out    Meningococcal (ACWY) vaccine  Aged Out    Pneumococcal 0-64 years Vaccine  Aged Out       No results found for: \"LABA1C\"          ( goal A1C is < 7)   No components found for: \"LABMICR\"  No components found for: \"LDLCHOLESTEROL\", \"LDLCALC\"    (goal LDL is <100)   No results found for: \"AST\", \"ALT\", \"BUN\", \"CR\"  BP Readings from Last 3 Encounters:   09/26/24 (!) 150/86   08/27/24 (!) 160/83          (goal 120/80)    All Future Testing planned in [facility]  Lab Frequency Next Occurrence   Lipid, Fasting Once 09/26/2024   Hemoglobin A1C Once 09/26/2024   Comprehensive Metabolic Panel Once 09/26/2024   HIV Screen Once 09/26/2024               Patient Active Problem List:     History of stroke     Chronic right-sided low back pain with right-sided sciatica     Paroxysmal A-fib (HCC)     Migraine with aura and without status migrainosus, not intractable     Primary hypertension

## 2024-12-30 ENCOUNTER — HOSPITAL ENCOUNTER (OUTPATIENT)
Dept: PAIN MANAGEMENT | Age: 64
Discharge: HOME | End: 2024-12-30
Payer: COMMERCIAL

## 2024-12-30 ENCOUNTER — OFFICE VISIT (OUTPATIENT)
Dept: NEUROLOGY | Age: 64
End: 2024-12-30
Payer: COMMERCIAL

## 2024-12-30 ENCOUNTER — TELEPHONE (OUTPATIENT)
Dept: NEUROLOGY | Age: 64
End: 2024-12-30

## 2024-12-30 VITALS
BODY MASS INDEX: 34.98 KG/M2 | SYSTOLIC BLOOD PRESSURE: 162 MMHG | HEIGHT: 75 IN | DIASTOLIC BLOOD PRESSURE: 95 MMHG | HEART RATE: 95 BPM | WEIGHT: 281.3 LBS

## 2024-12-30 VITALS — HEIGHT: 75 IN | WEIGHT: 275 LBS | BODY MASS INDEX: 34.19 KG/M2

## 2024-12-30 DIAGNOSIS — Z79.891 CHRONIC USE OF OPIATE FOR THERAPEUTIC PURPOSE: ICD-10-CM

## 2024-12-30 DIAGNOSIS — R00.2 PALPITATIONS: ICD-10-CM

## 2024-12-30 DIAGNOSIS — M79.18 MYOFASCIAL PAIN SYNDROME: ICD-10-CM

## 2024-12-30 DIAGNOSIS — M54.16 LUMBAR RADICULOPATHY: Primary | ICD-10-CM

## 2024-12-30 DIAGNOSIS — G43.011 INTRACTABLE MIGRAINE WITHOUT AURA WITH STATUS MIGRAINOSUS: Primary | ICD-10-CM

## 2024-12-30 DIAGNOSIS — Z86.73 HISTORY OF STROKE: ICD-10-CM

## 2024-12-30 DIAGNOSIS — E66.811 CLASS 1 OBESITY WITH BODY MASS INDEX (BMI) OF 34.0 TO 34.9 IN ADULT, UNSPECIFIED OBESITY TYPE, UNSPECIFIED WHETHER SERIOUS COMORBIDITY PRESENT: ICD-10-CM

## 2024-12-30 PROCEDURE — 99215 OFFICE O/P EST HI 40 MIN: CPT | Performed by: STUDENT IN AN ORGANIZED HEALTH CARE EDUCATION/TRAINING PROGRAM

## 2024-12-30 PROCEDURE — 99204 OFFICE O/P NEW MOD 45 MIN: CPT | Performed by: PHYSICIAN ASSISTANT

## 2024-12-30 PROCEDURE — 99213 OFFICE O/P EST LOW 20 MIN: CPT

## 2024-12-30 PROCEDURE — 3080F DIAST BP >= 90 MM HG: CPT | Performed by: PHYSICIAN ASSISTANT

## 2024-12-30 PROCEDURE — 3077F SYST BP >= 140 MM HG: CPT | Performed by: PHYSICIAN ASSISTANT

## 2024-12-30 RX ORDER — OMEPRAZOLE 40 MG/1
CAPSULE, DELAYED RELEASE ORAL
COMMUNITY
Start: 2024-12-26

## 2024-12-30 RX ORDER — BUSPIRONE HYDROCHLORIDE 10 MG/1
10 TABLET ORAL 3 TIMES DAILY
COMMUNITY
Start: 2024-11-14

## 2024-12-30 RX ORDER — CHOLECALCIFEROL (VITAMIN D3) 25 MCG
1 TABLET ORAL DAILY
COMMUNITY
Start: 2024-12-26

## 2024-12-30 RX ORDER — GABAPENTIN 300 MG/1
CAPSULE ORAL
COMMUNITY
Start: 2024-12-26

## 2024-12-30 RX ORDER — LORAZEPAM 1 MG/1
1 TABLET ORAL 2 TIMES DAILY PRN
COMMUNITY
Start: 2024-12-12

## 2024-12-30 RX ORDER — DISULFIRAM 250 MG/1
250 TABLET ORAL DAILY
COMMUNITY
Start: 2024-12-03

## 2024-12-30 RX ORDER — TAMSULOSIN HYDROCHLORIDE 0.4 MG/1
CAPSULE ORAL
COMMUNITY
Start: 2024-12-26

## 2024-12-30 RX ORDER — FLUTICASONE PROPIONATE 50 MCG
SPRAY, SUSPENSION (ML) NASAL
COMMUNITY
Start: 2024-12-02

## 2024-12-30 RX ORDER — OXYCODONE AND ACETAMINOPHEN 5; 325 MG/1; MG/1
1 TABLET ORAL EVERY 8 HOURS PRN
Qty: 90 TABLET | Refills: 0 | OUTPATIENT
Start: 2024-12-30 | End: 2025-01-29

## 2024-12-30 RX ORDER — MONTELUKAST SODIUM 10 MG/1
10 TABLET ORAL NIGHTLY
COMMUNITY
Start: 2024-12-26

## 2024-12-30 RX ORDER — BISOPROLOL FUMARATE 5 MG/1
5 TABLET, FILM COATED ORAL DAILY
COMMUNITY
Start: 2024-12-26

## 2024-12-30 RX ORDER — TIOTROPIUM BROMIDE INHALATION SPRAY 3.12 UG/1
SPRAY, METERED RESPIRATORY (INHALATION)
COMMUNITY
Start: 2024-12-02

## 2024-12-30 RX ORDER — FREMANEZUMAB-VFRM 225 MG/1.5ML
225 INJECTION SUBCUTANEOUS
Qty: 1 ADJUSTABLE DOSE PRE-FILLED PEN SYRINGE | Refills: 5 | Status: SHIPPED | OUTPATIENT
Start: 2024-12-30

## 2024-12-30 RX ORDER — ESCITALOPRAM OXALATE 20 MG/1
20 TABLET ORAL DAILY
COMMUNITY
Start: 2024-12-26

## 2024-12-30 RX ORDER — IBUPROFEN 800 MG/1
TABLET, FILM COATED ORAL
COMMUNITY
Start: 2024-12-26

## 2024-12-30 RX ORDER — PROPRANOLOL HYDROCHLORIDE 40 MG/1
40 TABLET ORAL 2 TIMES DAILY
COMMUNITY
Start: 2024-12-26

## 2024-12-30 RX ORDER — FREMANEZUMAB-VFRM 225 MG/1.5ML
225 INJECTION SUBCUTANEOUS
Qty: 1 ADJUSTABLE DOSE PRE-FILLED PEN SYRINGE | Refills: 0 | Status: SHIPPED | COMMUNITY
Start: 2024-12-30

## 2024-12-30 RX ORDER — BUDESONIDE AND FORMOTEROL FUMARATE DIHYDRATE 160; 4.5 UG/1; UG/1
AEROSOL RESPIRATORY (INHALATION)
COMMUNITY
Start: 2024-12-26

## 2024-12-30 RX ORDER — CETIRIZINE HYDROCHLORIDE 10 MG/1
10 TABLET ORAL DAILY
COMMUNITY
Start: 2024-12-18

## 2024-12-30 RX ORDER — TRAZODONE HYDROCHLORIDE 150 MG/1
TABLET ORAL
COMMUNITY
Start: 2024-12-26

## 2024-12-30 NOTE — PROGRESS NOTES
daily  LDL goal < 70  BP goal < 140/90  On Xarelto for a-fib  Palpitations  Cardiology establishing appointment not until March  Echo ordered          BIPIN Katz     [facility]

## 2024-12-30 NOTE — PROGRESS NOTES
appropriate.  I refilled his Percocet at today's visit.  I re-iterated to the patient that he will need to continue to wean off his benzodiazepine, otherwise we will no longer prescribe opioid medication.  Patient understood.    PLAN:  Medications:    For nonopioid therapy, the following medications were prescribed:    -Continue medication prescribed by primary care physician    Opioid therapy:  -Continue Percocet 5/325 mg 3 times daily as needed, refilled  -Pain Treatment agreement: Signed at today's visit 12/30/2024  -Urine Drug Screen: 10/18/2024, reviewed and appropriate  -OARRS reviewed and appropriate    Interventions:  -Consider spine injections in future    Imaging:  -Will plan for new lumbar MRI once his insurance changes over in early February  -Reviewed old MRI from previous pain management provider    Behavioral Therapies:  -Continue daily stretching and home exercise program    Referrals:  -None    Follow-up Plan:  -1 month    Patient was offered intervention where appropriate.     Multi-modal Pain Therapy:  The patient was explicitly considered for multimodal and interdisciplinary therapy. Non-opioid and non-pharmacological opportunities to enhance analgesia and quality of life have been and will continue to be pursued.    Opioid Therapy: Education provided to patient regarding short term and long term implications of opioid medication use. Repeat opioid risk stratification today, discussion regarding functional achievements, safe storage, and optimization of non-opioid interventional, behavioral, and pharmaceutical modalities. Will continue attempt to wean off medication as appropriate.    Vasyl Jean-Baptiste DO  Interventional Pain Management/PM&R   [facility]    44 minutes was utilized for this patient encounter including face-to-face time with patient, reviewing over 80 pages of notes and images from previous pain management provider, documentation, reviewing previous imaging,

## 2025-01-06 ENCOUNTER — TELEPHONE (OUTPATIENT)
Dept: PAIN MANAGEMENT | Age: 65
End: 2025-01-06

## 2025-01-06 DIAGNOSIS — M54.16 LUMBAR RADICULOPATHY: ICD-10-CM

## 2025-01-06 RX ORDER — OXYCODONE AND ACETAMINOPHEN 5; 325 MG/1; MG/1
1 TABLET ORAL EVERY 8 HOURS PRN
Qty: 90 TABLET | Refills: 0 | Status: SHIPPED | OUTPATIENT
Start: 2025-01-06 | End: 2025-02-05

## 2025-01-06 NOTE — TELEPHONE ENCOUNTER
Pt called the office to state that he called in for a refill of his percocet and has not received it yet. Pt is asking for the r/f to go to [facility]  pharmacy.

## 2025-01-10 DIAGNOSIS — Z86.73 HISTORY OF STROKE: ICD-10-CM

## 2025-01-10 RX ORDER — ATORVASTATIN CALCIUM 40 MG/1
40 TABLET, FILM COATED ORAL DAILY
Qty: 30 TABLET | Refills: 3 | Status: SHIPPED | OUTPATIENT
Start: 2025-01-10

## 2025-01-10 NOTE — TELEPHONE ENCOUNTER
Writer left voicemail for patient to return call to clinic to schedule an appt. Mychart is not active.       Please address the medication refill and close the encounter.  If I can be of assistance, please route to the applicable pool.      Thank you.      Last visit: 9-  Last Med refill: 9-  Does patient have enough medication for 72 hours: No:     Next Visit Date:  Future Appointments   Date Time Provider Department Center   1/13/2025  9:30 AM ST[facility] ECHO 2 [facility]   1/29/2025 10:30 AM Vasyl Jean-Baptiste,  [facility] [place]   3/3/2025  9:20 AM Gaby Romero PA Neuro Spec Neurology -   3/7/2025  1:45 PM Mal Barksdale MD [facility]       Health Maintenance   Topic Date Due    Lipids  Never done    HIV screen  Never done    Hepatitis C screen  Never done    DTaP/Tdap/Td vaccine (1 - Tdap) Never done    Diabetes screen  Never done    Colorectal Cancer Screen  Never done    Shingles vaccine (1 of 2) Never done    Respiratory Syncytial Virus (RSV) Pregnant or age 60 yrs+ (1 - Risk 60-74 years 1-dose series) Never done    COVID-19 Vaccine (1 - 2023-24 season) Never done    Depression Screen  09/26/2025    Flu vaccine  Completed    Hepatitis A vaccine  Aged Out    Hepatitis B vaccine  Aged Out    Hib vaccine  Aged Out    Polio vaccine  Aged Out    Meningococcal (ACWY) vaccine  Aged Out    Pneumococcal 0-64 years Vaccine  Aged Out       No results found for: \"LABA1C\"          ( goal A1C is < 7)   No components found for: \"LABMICR\"  No components found for: \"LDLCHOLESTEROL\", \"LDLCALC\"    (goal LDL is <100)   No results found for: \"AST\", \"ALT\", \"BUN\", \"CR\"  BP Readings from Last 3 Encounters:   12/30/24 (!) 162/95   09/26/24 (!) 150/86   08/27/24 (!) 160/83          (goal 120/80)    All Future Testing planned in [facility]  Lab Frequency Next Occurrence   Lipid, Fasting Once 09/26/2024   Hemoglobin A1C Once 09/26/2024   Comprehensive Metabolic Panel Once 09/26/2024   HIV

## 2025-02-03 ENCOUNTER — HOSPITAL ENCOUNTER (OUTPATIENT)
Dept: PAIN MANAGEMENT | Age: 65
Discharge: HOME | End: 2025-02-03
Payer: MEDICARE

## 2025-02-03 VITALS — WEIGHT: 281 LBS | HEIGHT: 75 IN | BODY MASS INDEX: 34.94 KG/M2

## 2025-02-03 DIAGNOSIS — M79.18 MYOFASCIAL PAIN SYNDROME: ICD-10-CM

## 2025-02-03 DIAGNOSIS — M54.16 LUMBAR RADICULOPATHY: ICD-10-CM

## 2025-02-03 DIAGNOSIS — E66.811 CLASS 1 OBESITY WITH BODY MASS INDEX (BMI) OF 34.0 TO 34.9 IN ADULT, UNSPECIFIED OBESITY TYPE, UNSPECIFIED WHETHER SERIOUS COMORBIDITY PRESENT: ICD-10-CM

## 2025-02-03 DIAGNOSIS — Z79.891 CHRONIC USE OF OPIATE FOR THERAPEUTIC PURPOSE: ICD-10-CM

## 2025-02-03 DIAGNOSIS — M47.817 LUMBOSACRAL SPONDYLOSIS WITHOUT MYELOPATHY: Primary | ICD-10-CM

## 2025-02-03 PROCEDURE — 99213 OFFICE O/P EST LOW 20 MIN: CPT

## 2025-02-03 PROCEDURE — 99214 OFFICE O/P EST MOD 30 MIN: CPT | Performed by: STUDENT IN AN ORGANIZED HEALTH CARE EDUCATION/TRAINING PROGRAM

## 2025-02-03 RX ORDER — OXYCODONE AND ACETAMINOPHEN 5; 325 MG/1; MG/1
1 TABLET ORAL EVERY 8 HOURS PRN
Qty: 90 TABLET | Refills: 0 | Status: SHIPPED | OUTPATIENT
Start: 2025-02-05 | End: 2025-03-07

## 2025-02-03 ASSESSMENT — PAIN SCALES - GENERAL: PAINLEVEL_OUTOF10: 6

## 2025-02-03 NOTE — PROGRESS NOTES
Chronic Pain Clinic Note     Encounter Date: 2/3/2025     SUBJECTIVE:  Chief Complaint   Patient presents with    Back Pain     Med refill       History of Present Illness:   Pedro Whitfield is a 64 y.o. male who presents with back pain    Medication Refill: Percocet - pt did not bring medication with him today, pt is not sure how many he has left    Current Complaints of Pain:   Location: back   Radiation: Right leg   Severity: Moderate  Pain Numerical Score - 6 today    Average: 4/5 with Percocet     Highest:  9  Lowest: 4  Character/Quality: Complains of pain that is aching, burning, throbbing   Timing: Constant  Associated symptoms: none  Numbness: no  Weakness: no  Exacerbating factors: pain is just there  Alleviating factors:  sleeping & meds   Length of time pain has been present: Started about 7 years ago   Inciting event/injury: no  Bowel/Bladder incontinence: no  Falls: none in the past month   Physical Therapy: no    History of Interventions:   Surgery: No previous lumbar/cervical surgeries  [place] one was about 06/16/2024    Imaging:    Old lumbar MRI reviewed    Past Medical History:   Diagnosis Date    Cerebral artery occlusion with cerebral infarction (HCC)     Headache     Hypertension     Memory disorder     Migraine     Sleep difficulties        History reviewed. No pertinent surgical history.    History reviewed. No pertinent family history.    Social History     Socioeconomic History    Marital status: Unknown     Spouse name: Not on file    Number of children: Not on file    Years of education: Not on file    Highest education level: Not on file   Occupational History    Not on file   Tobacco Use    Smoking status: Never     Passive exposure: Never    Smokeless tobacco: Never   Substance and Sexual Activity    Alcohol use: Not Currently    Drug use: Never    Sexual activity: Yes     Partners: Female   Other Topics Concern    Not on file   Social History Narrative

## 2025-02-07 ENCOUNTER — TELEPHONE (OUTPATIENT)
Age: 65
End: 2025-02-07

## 2025-02-07 NOTE — TELEPHONE ENCOUNTER
Called patient and moved appt up from 3/7 to 2/28 with Dr. Barksdale at [facility].     Patient stated as of today 2/7 he is feeling ok but has had 3 AFIB episodes and one was really bad with chest pains and headache but has not had an episode since. Writer explained to patient that if an episode like that happens again to follow up with ER to get evaluated.     Patient voiced understanding.

## 2025-02-11 DIAGNOSIS — I48.0 PAROXYSMAL A-FIB (HCC): ICD-10-CM

## 2025-02-12 RX ORDER — RIVAROXABAN 20 MG/1
TABLET, FILM COATED ORAL
Qty: 30 TABLET | Refills: 2 | Status: SHIPPED | OUTPATIENT
Start: 2025-02-12

## 2025-02-12 NOTE — TELEPHONE ENCOUNTER
Last visit: 09/26/2024  Last Med refill: 09/26/2024  Does patient have enough medication for 72 hours: No:     Next Visit Date:  Future Appointments   Date Time Provider Department Center   2/28/2025  1:15 PM Mal Barksdale MD [facility]   3/3/2025  9:20 AM Gaby Romero PA [facility] -   3/5/2025  1:20 PM Carolina Milner, APRN - CNP [facility] [place]       Health Maintenance   Topic Date Due    Lipids  Never done    HIV screen  Never done    Hepatitis C screen  Never done    DTaP/Tdap/Td vaccine (1 - Tdap) Never done    Diabetes screen  Never done    Colorectal Cancer Screen  Never done    Shingles vaccine (1 of 2) Never done    Respiratory Syncytial Virus (RSV) Pregnant or age 60 yrs+ (1 - Risk 60-74 years 1-dose series) Never done    COVID-19 Vaccine (1 - 2024-25 season) Never done    Annual Wellness Visit (Medicare Advantage)  Never done    Depression Screen  09/26/2025    Flu vaccine  Completed    Pneumococcal 50+ years Vaccine  Completed    Hepatitis A vaccine  Aged Out    Hepatitis B vaccine  Aged Out    Hib vaccine  Aged Out    Polio vaccine  Aged Out    Meningococcal (ACWY) vaccine  Aged Out    Pneumococcal 0-49 years Vaccine  Discontinued       No results found for: \"LABA1C\"          ( goal A1C is < 7)   No components found for: \"LABMICR\"  No components found for: \"LDLCHOLESTEROL\", \"LDLCALC\"    (goal LDL is <100)   No results found for: \"AST\", \"ALT\", \"BUN\", \"CR\"  BP Readings from Last 3 Encounters:   12/30/24 (!) 162/95   09/26/24 (!) 150/86   08/27/24 (!) 160/83          (goal 120/80)    All Future Testing planned in [facility]  Lab Frequency Next Occurrence   Lipid, Fasting Once 09/26/2024   Hemoglobin A1C Once 09/26/2024   Comprehensive Metabolic Panel Once 09/26/2024   HIV Screen Once 09/26/2024   Echo (TTE) complete (PRN contrast/bubble/strain/3D) Once 12/30/2024   MRI LUMBAR SPINE WO CONTRAST Once 02/03/2025               Patient Active Problem List:

## 2025-02-19 DIAGNOSIS — J30.2 SEASONAL ALLERGIES: ICD-10-CM

## 2025-02-19 RX ORDER — KETOTIFEN FUMARATE 0.35 MG/ML
SOLUTION/ DROPS OPHTHALMIC
Qty: 10 ML | Refills: 0 | Status: SHIPPED | OUTPATIENT
Start: 2025-02-19

## 2025-02-19 NOTE — TELEPHONE ENCOUNTER
Last visit: 09/26/2024  Last Med refill: 12/27/2024  Does patient have enough medication for 72 hours: No:     Next Visit Date:  Future Appointments   Date Time Provider Department Center   2/28/2025  1:15 PM Mla Barksdale MD [facility]   3/3/2025  9:20 AM Gaby Romero PA [facility] -   3/5/2025  1:20 PM Carolina Milner, APRN - CNP [facility] [place]       Health Maintenance   Topic Date Due    Lipids  Never done    HIV screen  Never done    Hepatitis C screen  Never done    DTaP/Tdap/Td vaccine (1 - Tdap) Never done    Diabetes screen  Never done    Colorectal Cancer Screen  Never done    Shingles vaccine (1 of 2) Never done    Respiratory Syncytial Virus (RSV) Pregnant or age 60 yrs+ (1 - Risk 60-74 years 1-dose series) Never done    COVID-19 Vaccine (1 - 2024-25 season) Never done    Annual Wellness Visit (Medicare Advantage)  Never done    Depression Screen  09/26/2025    Flu vaccine  Completed    Pneumococcal 50+ years Vaccine  Completed    Hepatitis A vaccine  Aged Out    Hepatitis B vaccine  Aged Out    Hib vaccine  Aged Out    Polio vaccine  Aged Out    Meningococcal (ACWY) vaccine  Aged Out    Pneumococcal 0-49 years Vaccine  Discontinued       No results found for: \"LABA1C\"          ( goal A1C is < 7)   No components found for: \"LABMICR\"  No components found for: \"LDLCHOLESTEROL\", \"LDLCALC\"    (goal LDL is <100)   No results found for: \"AST\", \"ALT\", \"BUN\", \"CR\"  BP Readings from Last 3 Encounters:   12/30/24 (!) 162/95   09/26/24 (!) 150/86   08/27/24 (!) 160/83          (goal 120/80)    All Future Testing planned in [facility]  Lab Frequency Next Occurrence   Lipid, Fasting Once 09/26/2024   Hemoglobin A1C Once 09/26/2024   Comprehensive Metabolic Panel Once 09/26/2024   HIV Screen Once 09/26/2024   Echo (TTE) complete (PRN contrast/bubble/strain/3D) Once 12/30/2024   MRI LUMBAR SPINE WO CONTRAST Once 02/03/2025               Patient Active Problem List:

## 2025-02-28 ENCOUNTER — OFFICE VISIT (OUTPATIENT)
Age: 65
End: 2025-02-28
Payer: MEDICARE

## 2025-02-28 VITALS
WEIGHT: 288.8 LBS | SYSTOLIC BLOOD PRESSURE: 172 MMHG | HEART RATE: 49 BPM | BODY MASS INDEX: 36.1 KG/M2 | OXYGEN SATURATION: 94 % | DIASTOLIC BLOOD PRESSURE: 101 MMHG

## 2025-02-28 DIAGNOSIS — I48.0 PAROXYSMAL A-FIB (HCC): Primary | ICD-10-CM

## 2025-02-28 PROCEDURE — 3080F DIAST BP >= 90 MM HG: CPT | Performed by: INTERNAL MEDICINE

## 2025-02-28 PROCEDURE — 93000 ELECTROCARDIOGRAM COMPLETE: CPT | Performed by: INTERNAL MEDICINE

## 2025-02-28 PROCEDURE — 3077F SYST BP >= 140 MM HG: CPT | Performed by: INTERNAL MEDICINE

## 2025-02-28 PROCEDURE — 99204 OFFICE O/P NEW MOD 45 MIN: CPT | Performed by: INTERNAL MEDICINE

## 2025-02-28 RX ORDER — BLOOD PRESSURE TEST KIT
KIT MISCELLANEOUS
Qty: 1 KIT | Refills: 0 | OUTPATIENT
Start: 2025-02-28

## 2025-02-28 NOTE — PROGRESS NOTES
Cardiology Office Consultation           CC: Patient is here to establish cardiac care for paroxysmal atrial fibrillation    HPI  Pedro Whitfield is here to establish cardiac care.  He reports history of paroxysmal atrial fibrillation, previously followed with cardiologist at [place], recently moved to [facility], reports intermittent episodes of palpitations, last 1 was 1 month ago, he he is currently taking propranolol, bisoprolol and Cardizem, he reports that his bisoprolol was taken off after which he had severe palpitations and rebound hypertension, not willing to try discontinuing propranolol and bisoprolol anymore.  His blood pressure is suboptimally controlled today.  He denies any chest pain.  He reports baseline dyspnea on exertion.  ECG shows sinus bradycardia.    Past Medical:  Past Medical History:   Diagnosis Date    Cerebral artery occlusion with cerebral infarction (HCC)     Headache     Hypertension     Memory disorder     Migraine     Sleep difficulties        Past Surgical:  No past surgical history on file.    Family History:  No family history on file.    Social History:  Social History     Tobacco Use    Smoking status: Never     Passive exposure: Never    Smokeless tobacco: Never   Substance Use Topics    Alcohol use: Not Currently    Drug use: Never        REVIEW OF SYSTEMS:    Constitutional: there has been decline in functional capacity overall  Eyes: No visual changes or diplopia. No scleral icterus.  ENT: No Headaches, hearing loss or vertigo. No mouth sores or sore throat.  Cardiovascular: As described in HPI.   Respiratory: AS HPI  Gastrointestinal: No abdominal pain, appetite loss, blood in stools. No change in bowel or bladder habits.  Genitourinary: No dysuria, trouble voiding, or hematuria.  Musculoskeletal:  No gait disturbance, No weakness or joint complaints.  Integumentary: No rash or pruritis.  Neurological: No headache, diplopia, change in muscle strength, numbness or

## 2025-03-01 RX ORDER — BLOOD PRESSURE TEST KIT
KIT MISCELLANEOUS
Qty: 1 KIT | Refills: 0 | Status: SHIPPED | OUTPATIENT
Start: 2025-03-01

## 2025-03-01 RX ORDER — PROPRANOLOL HYDROCHLORIDE 40 MG/1
40 TABLET ORAL 2 TIMES DAILY
Qty: 180 TABLET | Refills: 3 | Status: SHIPPED | OUTPATIENT
Start: 2025-03-01

## 2025-03-07 ENCOUNTER — HOSPITAL ENCOUNTER (OUTPATIENT)
Dept: PAIN MANAGEMENT | Age: 65
Discharge: HOME | End: 2025-03-07
Payer: MEDICARE

## 2025-03-07 VITALS — HEIGHT: 75 IN | WEIGHT: 288 LBS | BODY MASS INDEX: 35.81 KG/M2

## 2025-03-07 DIAGNOSIS — M47.817 LUMBOSACRAL SPONDYLOSIS WITHOUT MYELOPATHY: Primary | ICD-10-CM

## 2025-03-07 DIAGNOSIS — M54.16 LUMBAR RADICULOPATHY: ICD-10-CM

## 2025-03-07 DIAGNOSIS — G89.29 CHRONIC RIGHT-SIDED LOW BACK PAIN WITH RIGHT-SIDED SCIATICA: ICD-10-CM

## 2025-03-07 DIAGNOSIS — M54.41 CHRONIC RIGHT-SIDED LOW BACK PAIN WITH RIGHT-SIDED SCIATICA: ICD-10-CM

## 2025-03-07 DIAGNOSIS — Z79.891 CHRONIC USE OF OPIATE FOR THERAPEUTIC PURPOSE: ICD-10-CM

## 2025-03-07 PROCEDURE — 99213 OFFICE O/P EST LOW 20 MIN: CPT | Performed by: NURSE PRACTITIONER

## 2025-03-07 PROCEDURE — 99213 OFFICE O/P EST LOW 20 MIN: CPT

## 2025-03-07 RX ORDER — OXYCODONE AND ACETAMINOPHEN 5; 325 MG/1; MG/1
1 TABLET ORAL EVERY 8 HOURS PRN
Qty: 90 TABLET | Refills: 0 | Status: SHIPPED | OUTPATIENT
Start: 2025-03-07 | End: 2025-04-06

## 2025-03-07 ASSESSMENT — ENCOUNTER SYMPTOMS
COUGH: 0
BACK PAIN: 1
SHORTNESS OF BREATH: 0
CONSTIPATION: 0

## 2025-03-07 NOTE — PROGRESS NOTES
Chief Complaint   Patient presents with    Back Pain     Med refill         PMH  Pt complains of back pain. Lumbar MRI ordered 2/3/25 but not scheduled. Pt had injections at another pain clinic in the past with some relief. He has tried PT with no relief. He is taking percocet 5/325 TID with some relief. He is also taking lorazepam. Dr. Jean-Baptiste has discussed tapering this at last two visits but pt refilled the prescription again 3/3/25.    Patient was warned of the risk of taking a Benzodiazepine along with an Opioid. Risk of respiratory depression and or death.  Patient was advised to talk with the primary care provider who prescribes their benzodiazepine to let them know they are on an opioid for pain and to discuss with them if an alternate medication could be prescribed. He states he will work on weaning off. The goal will be to have him completely off in two months. He is agreeable to this.     Back Pain  This is a chronic problem. The current episode started more than 1 year ago. The problem occurs constantly. The problem is unchanged. The pain is present in the lumbar spine. The quality of the pain is described as aching. The pain radiates to the right knee and right thigh. The pain is at a severity of 7/10. The pain is moderate. The pain is The same all the time. The symptoms are aggravated by bending, sitting, standing and position. Stiffness is present In the morning. Associated symptoms include headaches and leg pain. Pertinent negatives include no chest pain, fever, numbness, tingling or weakness. He has tried heat, ice and analgesics for the symptoms. The treatment provided mild relief.     Patient denies any new neurological symptoms. No bowel or bladder incontinence, no weakness, and no falling.    Pill count: appropriate Percocet-0 due today    Morphine equivalent: 22.5    Controlled Substance Monitoring:    Acute and Chronic Pain Monitoring:   RX Monitoring Periodic Controlled Substance

## 2025-03-11 ENCOUNTER — TELEPHONE (OUTPATIENT)
Dept: PAIN MANAGEMENT | Age: 65
End: 2025-03-11

## 2025-03-11 NOTE — TELEPHONE ENCOUNTER
[facility] called asking for clarification in regards to sedation on MRI ordered 2/3/2025. They stated any sedation medication would need to be ordered from you. Pt reported to  that he is claustrophobic. Please advise.

## 2025-03-12 ENCOUNTER — TELEPHONE (OUTPATIENT)
Dept: PAIN MANAGEMENT | Age: 65
End: 2025-03-12

## 2025-03-12 DIAGNOSIS — M54.16 LUMBAR RADICULOPATHY: Primary | ICD-10-CM

## 2025-03-12 NOTE — TELEPHONE ENCOUNTER
Pt called to notify MRI is scheduled for 3/19/25, requesting medication to calm him due to being claustrophobic. Please advise.

## 2025-03-13 RX ORDER — ALPRAZOLAM 0.5 MG
0.5 TABLET ORAL ONCE
Qty: 1 TABLET | Refills: 0 | Status: SHIPPED | OUTPATIENT
Start: 2025-03-13 | End: 2025-03-13

## 2025-03-19 ENCOUNTER — HOSPITAL ENCOUNTER (OUTPATIENT)
Dept: MRI IMAGING | Facility: CLINIC | Age: 65
Discharge: HOME | End: 2025-03-21
Payer: MEDICARE

## 2025-03-19 DIAGNOSIS — M47.817 LUMBOSACRAL SPONDYLOSIS WITHOUT MYELOPATHY: ICD-10-CM

## 2025-03-19 PROCEDURE — 72148 MRI LUMBAR SPINE W/O DYE: CPT

## 2025-03-27 ENCOUNTER — TELEPHONE (OUTPATIENT)
Age: 65
End: 2025-03-27

## 2025-03-27 NOTE — TELEPHONE ENCOUNTER
Dr. Barksdale instructed PT to check blood pressures everyday for a week.  Pt is calling to give a follow up.  Pt states that the pressures are high and causing headaches.    2 times read 146/94  Today at his doctor appt it read 160/92 pt says he is not feeling well.    He also said that he would be provided a blood pressure cuff. What medical supply company did we recommend for him?    Please advise

## 2025-03-28 NOTE — TELEPHONE ENCOUNTER
Patient called back and writer explained we sent blood pressure kit to [facility] in [place]. Writer explained that pharmacy will contact patient with any updates.     Patient voiced understanding.

## 2025-03-28 NOTE — TELEPHONE ENCOUNTER
Sent blood pressure kit to [facility] 3/28/25.    Please advise next steps for high blood pressure readings?    *MA NOTE  Call patient back with any updates

## 2025-04-17 ENCOUNTER — OFFICE VISIT (OUTPATIENT)
Dept: NEUROLOGY | Age: 65
End: 2025-04-17
Payer: MEDICARE

## 2025-04-17 VITALS
DIASTOLIC BLOOD PRESSURE: 90 MMHG | HEIGHT: 75 IN | HEART RATE: 45 BPM | SYSTOLIC BLOOD PRESSURE: 158 MMHG | WEIGHT: 284 LBS | BODY MASS INDEX: 35.31 KG/M2

## 2025-04-17 DIAGNOSIS — I69.30 HISTORY OF STROKE WITH RESIDUAL DEFICIT: ICD-10-CM

## 2025-04-17 DIAGNOSIS — G43.011 INTRACTABLE MIGRAINE WITHOUT AURA WITH STATUS MIGRAINOSUS: Primary | ICD-10-CM

## 2025-04-17 DIAGNOSIS — Z86.73 HISTORY OF STROKE: ICD-10-CM

## 2025-04-17 PROCEDURE — 99214 OFFICE O/P EST MOD 30 MIN: CPT | Performed by: PHYSICIAN ASSISTANT

## 2025-04-17 PROCEDURE — 3080F DIAST BP >= 90 MM HG: CPT | Performed by: PHYSICIAN ASSISTANT

## 2025-04-17 PROCEDURE — 3077F SYST BP >= 140 MM HG: CPT | Performed by: PHYSICIAN ASSISTANT

## 2025-04-17 RX ORDER — EZETIMIBE 10 MG/1
10 TABLET ORAL DAILY
Qty: 30 TABLET | Refills: 5 | Status: SHIPPED | OUTPATIENT
Start: 2025-04-17

## 2025-04-17 NOTE — PROGRESS NOTES
[address]  [facility] [address]  Dept: [phone]    PATIENT NAME: Pedro Whitfield  PATIENT MRN: [id number]  PRIMARY CARE PHYSICIAN: Leeann Ramos MD    HPI:      Pedro Whitfield is a 64 y.o. male who presents to clinic today for evaluation of migraines. Other medical history is significant for paroxysmal a-fib on Xarelto, history of CVA Aug 2023 ASA 81 mg daily, HTN    For migraine prevention we restarted Ajovy, continued  propranolol and gabapentin. Reports headache management was only required after stroke Aug 2024. He has received 3-4 Ajovy injections.     Resuming Ajovy has been very effective. He has had a few breakthrough headaches leading up to next shot, but his continuous headache has subsided. He uses motrin 800 for rescue with limited response.    Prior information:     Headache description:  Location: bilat frontal/ parietal  Character: throbbing, constant  Duration and frequency: all day, every day  Associated symptoms: photophobia, phonophobia, nausea, vomiting, general blurred vision, dizzy  Denied associated symptoms: loss of vision, diplopia, visual aura, unilateral weakness, numbness, rhinorrhea  Rescue: ibuprofen 800 mg - mildly effective    He has failed Nurtec and Ubrelvy for rescue. Failed topiramate for prevention.     Previously while on Ajovy, he had little to no headaches.    He did have one episode of abrupt onset palpitations. He has appointment to establish with cardiology soon.     He reports he had a stroke in Aug 2023 manifesting as numbness of right upper, lower ext which is resolved and he reports residually he has headaches and memory issues. Admits issues with word-finding and recent conversations. He does not feel that memory issues are worsening at all in the time since the stroke. Specifically, if he is interrupted when speaking, he struggles to keep on topic.     He has never experienced seizures- no tongue-biting, incontinence, focal

## 2025-04-28 ENCOUNTER — TELEPHONE (OUTPATIENT)
Dept: NEUROLOGY | Age: 65
End: 2025-04-28

## 2025-04-28 DIAGNOSIS — G43.011 INTRACTABLE MIGRAINE WITHOUT AURA WITH STATUS MIGRAINOSUS: Primary | ICD-10-CM

## 2025-04-28 RX ORDER — RIMEGEPANT SULFATE 75 MG/75MG
75 TABLET, ORALLY DISINTEGRATING ORAL DAILY PRN
Qty: 8 TABLET | Refills: 5 | Status: SHIPPED | OUTPATIENT
Start: 2025-04-28

## 2025-04-28 NOTE — TELEPHONE ENCOUNTER
Recommend ajovy on every 28 day cycle to minimize wearing off. I know he tried nurtec before, but an additional option would be to have him take this every other day just during that week to try to keep the headache away, as this medication has a long half life

## 2025-04-28 NOTE — TELEPHONE ENCOUNTER
Pedro called into office stating he is still getting breakthrough headaches in the week leading up to his Ajovy injection. Please advise.     Pharmacy:   [facility] - [place] - [facility] [address] - P [phone] - F [phone]

## 2025-04-28 NOTE — TELEPHONE ENCOUNTER
Patient stated that he is currently already on a 28 day cycle for his injection. He is agreeable to try the Nurtec.

## 2025-05-09 ENCOUNTER — TELEPHONE (OUTPATIENT)
Dept: NEUROLOGY | Age: 65
End: 2025-05-09

## 2025-05-09 NOTE — TELEPHONE ENCOUNTER
Evan approved until 5/9/26.      Pt called and informed of both medication approvals. He stated his understanding.

## 2025-05-09 NOTE — TELEPHONE ENCOUNTER
Received a fax from pharmacy stating Nurtec and Evan needs PA. Nurtec was completed in EPIC. Evan was completed on CM.

## 2025-05-21 ENCOUNTER — HOSPITAL ENCOUNTER (OUTPATIENT)
Dept: PAIN MANAGEMENT | Age: 65
Discharge: HOME | End: 2025-05-21
Payer: MEDICARE

## 2025-05-21 VITALS — BODY MASS INDEX: 35.31 KG/M2 | WEIGHT: 284 LBS | HEIGHT: 75 IN

## 2025-05-21 DIAGNOSIS — G89.29 CHRONIC RIGHT-SIDED LOW BACK PAIN WITH RIGHT-SIDED SCIATICA: ICD-10-CM

## 2025-05-21 DIAGNOSIS — M47.817 LUMBOSACRAL SPONDYLOSIS WITHOUT MYELOPATHY: Primary | ICD-10-CM

## 2025-05-21 DIAGNOSIS — M54.16 LUMBAR RADICULOPATHY: ICD-10-CM

## 2025-05-21 DIAGNOSIS — M54.41 CHRONIC RIGHT-SIDED LOW BACK PAIN WITH RIGHT-SIDED SCIATICA: ICD-10-CM

## 2025-05-21 DIAGNOSIS — Z79.891 CHRONIC USE OF OPIATE FOR THERAPEUTIC PURPOSE: ICD-10-CM

## 2025-05-21 PROCEDURE — 99213 OFFICE O/P EST LOW 20 MIN: CPT

## 2025-05-21 PROCEDURE — 99213 OFFICE O/P EST LOW 20 MIN: CPT | Performed by: NURSE PRACTITIONER

## 2025-05-21 RX ORDER — OXYCODONE AND ACETAMINOPHEN 5; 325 MG/1; MG/1
1 TABLET ORAL EVERY 8 HOURS PRN
Qty: 90 TABLET | Refills: 0 | Status: SHIPPED | OUTPATIENT
Start: 2025-05-21 | End: 2025-06-20

## 2025-05-21 ASSESSMENT — ENCOUNTER SYMPTOMS
COUGH: 0
CONSTIPATION: 0
BACK PAIN: 1
SHORTNESS OF BREATH: 0
BOWEL INCONTINENCE: 0

## 2025-05-21 ASSESSMENT — PAIN SCALES - GENERAL: PAINLEVEL_OUTOF10: 8

## 2025-06-06 ENCOUNTER — TELEPHONE (OUTPATIENT)
Dept: GASTROENTEROLOGY | Age: 65
End: 2025-06-06

## 2025-06-06 ENCOUNTER — PREP FOR PROCEDURE (OUTPATIENT)
Dept: GASTROENTEROLOGY | Age: 65
End: 2025-06-06

## 2025-06-06 DIAGNOSIS — Z12.11 COLON CANCER SCREENING: ICD-10-CM

## 2025-06-06 RX ORDER — BISACODYL 5 MG
TABLET, DELAYED RELEASE (ENTERIC COATED) ORAL
Qty: 4 TABLET | Refills: 0 | Status: SHIPPED | OUTPATIENT
Start: 2025-06-06

## 2025-06-06 RX ORDER — POLYETHYLENE GLYCOL 3350, SODIUM SULFATE ANHYDROUS, SODIUM BICARBONATE, SODIUM CHLORIDE, POTASSIUM CHLORIDE 236; 22.74; 6.74; 5.86; 2.97 G/4L; G/4L; G/4L; G/4L; G/4L
POWDER, FOR SOLUTION ORAL
Qty: 4000 ML | Refills: 0 | Status: SHIPPED | OUTPATIENT
Start: 2025-06-06

## 2025-06-06 NOTE — TELEPHONE ENCOUNTER
Procedure scheduled/Dr Maria  Procedure:colon  Dx: screen  Date:06/10/2025  Time:930 am arrive 730 am   Hospital:[facility] phone call:tbd  Bowel Prep instructions given:  In office/via phone: in office  Clearance needed:no  GLP - 1:

## 2025-06-09 ENCOUNTER — HOSPITAL ENCOUNTER (OUTPATIENT)
Dept: PREADMISSION TESTING | Age: 65
Setting detail: OUTPATIENT SURGERY
Discharge: HOME | End: 2025-06-13
Attending: STUDENT IN AN ORGANIZED HEALTH CARE EDUCATION/TRAINING PROGRAM
Payer: MEDICARE

## 2025-06-09 ENCOUNTER — ANESTHESIA EVENT (OUTPATIENT)
Dept: ENDOSCOPY | Age: 65
End: 2025-06-09
Payer: MEDICARE

## 2025-06-09 VITALS — BODY MASS INDEX: 35.43 KG/M2 | HEIGHT: 75 IN | WEIGHT: 285 LBS

## 2025-06-09 RX ORDER — ALBUTEROL SULFATE 90 UG/1
INHALANT RESPIRATORY (INHALATION)
COMMUNITY
Start: 2025-05-21

## 2025-06-09 RX ORDER — AZELASTINE HYDROCHLORIDE 137 UG/1
SPRAY, METERED NASAL
COMMUNITY
Start: 2025-05-21

## 2025-06-09 RX ORDER — IBUPROFEN 800 MG/1
TABLET, FILM COATED ORAL
COMMUNITY
Start: 2025-05-21

## 2025-06-09 RX ORDER — FEXOFENADINE HCL 180 MG/1
TABLET ORAL
COMMUNITY
Start: 2025-05-27

## 2025-06-09 RX ORDER — TADALAFIL 20 MG/1
TABLET ORAL
COMMUNITY
Start: 2025-03-10

## 2025-06-09 RX ORDER — PRAZOSIN HYDROCHLORIDE 5 MG/1
CAPSULE ORAL
COMMUNITY
Start: 2025-05-05

## 2025-06-09 NOTE — PROGRESS NOTES
Pre-op Instructions For Out-Patient Endoscopy Surgery    Medication Instructions:  Please stop herbs and any supplements now (includes vitamins and minerals).    For these medications:  Dulaglutide (Trulicity), Exenatide (Byetta and Bydureon, Liraglutide (Victoza), Lixisenatide (Adlyxin), Semaglutide (Ozempic and Rybelsus), Tirzepatide (Mounjaro, Zepbound,Wegovy)- Stop 1 week prior if taking weekly or 1 day prior if taking every 12 hours or daily.     Please contact your surgeon and prescribing physician for pre-op instructions for any blood thinners.    If you have inhalers/aerosol treatments at home, please use them the morning of your surgery and bring the inhalers with you to the hospital.    Please take the following medications the morning of your surgery with a sip of water:    Propanolol, Bisoprolol, Cardizem    Surgery Instructions:  After midnight before surgery:  Do not eat or drink anything, including water, mints, gum, and hard candy.  You may brush your teeth without swallowing.  No smoking, chewing tobacco, or street drugs.       ** Please Follow Bowel Prep instructions if given by surgeon's office**    Please shower or bathe before surgery.       Please do not wear any cologne, lotion, powder, jewelry, piercings, perfume, makeup, nail polish, hair accessories, or hair spray on the day of surgery.  Wear loose comfortable clothing.    Leave your valuables at home but bring a payment source for any after-surgery prescriptions you plan to fill at [facility].  Bring a storage case for any glasses/contacts.    An adult who is responsible for you MUST remain in the hospital and drive you home and should be with you for the first 24 hours after surgery.     The Day of Surgery:  Arrive at [facility] Entrance at the time directed by your surgeon and check in at the desk. 6/10/2025- Arrive at 730. Procedure scheduled for 930    If you have a living will or healthcare power of

## 2025-06-10 ENCOUNTER — HOSPITAL ENCOUNTER (OUTPATIENT)
Age: 65
Setting detail: OUTPATIENT SURGERY
Discharge: HOME | End: 2025-06-10
Attending: STUDENT IN AN ORGANIZED HEALTH CARE EDUCATION/TRAINING PROGRAM | Admitting: STUDENT IN AN ORGANIZED HEALTH CARE EDUCATION/TRAINING PROGRAM
Payer: MEDICARE

## 2025-06-10 ENCOUNTER — ANESTHESIA (OUTPATIENT)
Dept: ENDOSCOPY | Age: 65
End: 2025-06-10
Payer: MEDICARE

## 2025-06-10 VITALS
HEIGHT: 75 IN | SYSTOLIC BLOOD PRESSURE: 194 MMHG | RESPIRATION RATE: 11 BRPM | WEIGHT: 285 LBS | DIASTOLIC BLOOD PRESSURE: 92 MMHG | BODY MASS INDEX: 35.43 KG/M2 | OXYGEN SATURATION: 97 % | TEMPERATURE: 100 F | HEART RATE: 63 BPM

## 2025-06-10 DIAGNOSIS — I10 PRIMARY HYPERTENSION: ICD-10-CM

## 2025-06-10 DIAGNOSIS — I48.0 PAROXYSMAL A-FIB (HCC): ICD-10-CM

## 2025-06-10 DIAGNOSIS — Z12.11 COLON CANCER SCREENING: ICD-10-CM

## 2025-06-10 PROCEDURE — 45380 COLONOSCOPY AND BIOPSY: CPT | Performed by: STUDENT IN AN ORGANIZED HEALTH CARE EDUCATION/TRAINING PROGRAM

## 2025-06-10 PROCEDURE — 7100000011 HC PHASE II RECOVERY - ADDTL 15 MIN: Performed by: STUDENT IN AN ORGANIZED HEALTH CARE EDUCATION/TRAINING PROGRAM

## 2025-06-10 PROCEDURE — 3700000001 HC ADD 15 MINUTES (ANESTHESIA): Performed by: STUDENT IN AN ORGANIZED HEALTH CARE EDUCATION/TRAINING PROGRAM

## 2025-06-10 PROCEDURE — 2709999900 HC NON-CHARGEABLE SUPPLY: Performed by: STUDENT IN AN ORGANIZED HEALTH CARE EDUCATION/TRAINING PROGRAM

## 2025-06-10 PROCEDURE — 6360000002 HC RX W HCPCS

## 2025-06-10 PROCEDURE — 3609010600 HC COLONOSCOPY POLYPECTOMY SNARE/COLD BIOPSY: Performed by: STUDENT IN AN ORGANIZED HEALTH CARE EDUCATION/TRAINING PROGRAM

## 2025-06-10 PROCEDURE — 3700000000 HC ANESTHESIA ATTENDED CARE: Performed by: STUDENT IN AN ORGANIZED HEALTH CARE EDUCATION/TRAINING PROGRAM

## 2025-06-10 PROCEDURE — 88305 TISSUE EXAM BY PATHOLOGIST: CPT

## 2025-06-10 PROCEDURE — 45385 COLONOSCOPY W/LESION REMOVAL: CPT | Performed by: STUDENT IN AN ORGANIZED HEALTH CARE EDUCATION/TRAINING PROGRAM

## 2025-06-10 PROCEDURE — 7100000010 HC PHASE II RECOVERY - FIRST 15 MIN: Performed by: STUDENT IN AN ORGANIZED HEALTH CARE EDUCATION/TRAINING PROGRAM

## 2025-06-10 PROCEDURE — 2580000003 HC RX 258: Performed by: ANESTHESIOLOGY

## 2025-06-10 RX ORDER — DILTIAZEM HYDROCHLORIDE 120 MG/1
120 CAPSULE, COATED, EXTENDED RELEASE ORAL DAILY
Qty: 30 CAPSULE | Refills: 3 | Status: SHIPPED | OUTPATIENT
Start: 2025-06-10

## 2025-06-10 RX ORDER — SODIUM CHLORIDE 0.9 % (FLUSH) 0.9 %
5-40 SYRINGE (ML) INJECTION EVERY 12 HOURS SCHEDULED
Status: DISCONTINUED | OUTPATIENT
Start: 2025-06-10 | End: 2025-06-10 | Stop reason: HOSPADM

## 2025-06-10 RX ORDER — LIDOCAINE HYDROCHLORIDE 10 MG/ML
1 INJECTION, SOLUTION EPIDURAL; INFILTRATION; INTRACAUDAL; PERINEURAL
Status: DISCONTINUED | OUTPATIENT
Start: 2025-06-10 | End: 2025-06-10 | Stop reason: HOSPADM

## 2025-06-10 RX ORDER — SIMETHICONE 40MG/0.6ML
SUSPENSION, DROPS(FINAL DOSAGE FORM)(ML) ORAL PRN
Status: DISCONTINUED | OUTPATIENT
Start: 2025-06-10 | End: 2025-06-10 | Stop reason: ALTCHOICE

## 2025-06-10 RX ORDER — SODIUM CHLORIDE, SODIUM LACTATE, POTASSIUM CHLORIDE, CALCIUM CHLORIDE 600; 310; 30; 20 MG/100ML; MG/100ML; MG/100ML; MG/100ML
INJECTION, SOLUTION INTRAVENOUS CONTINUOUS
Status: DISCONTINUED | OUTPATIENT
Start: 2025-06-10 | End: 2025-06-10 | Stop reason: HOSPADM

## 2025-06-10 RX ORDER — PROPOFOL 10 MG/ML
INJECTION, EMULSION INTRAVENOUS
Status: DISCONTINUED | OUTPATIENT
Start: 2025-06-10 | End: 2025-06-10 | Stop reason: SDUPTHER

## 2025-06-10 RX ORDER — SODIUM CHLORIDE 0.9 % (FLUSH) 0.9 %
5-40 SYRINGE (ML) INJECTION PRN
Status: DISCONTINUED | OUTPATIENT
Start: 2025-06-10 | End: 2025-06-10 | Stop reason: HOSPADM

## 2025-06-10 RX ORDER — SODIUM CHLORIDE 9 MG/ML
INJECTION, SOLUTION INTRAVENOUS PRN
Status: DISCONTINUED | OUTPATIENT
Start: 2025-06-10 | End: 2025-06-10 | Stop reason: HOSPADM

## 2025-06-10 RX ORDER — LIDOCAINE HYDROCHLORIDE 10 MG/ML
INJECTION, SOLUTION EPIDURAL; INFILTRATION; INTRACAUDAL; PERINEURAL
Status: DISCONTINUED | OUTPATIENT
Start: 2025-06-10 | End: 2025-06-10 | Stop reason: SDUPTHER

## 2025-06-10 RX ADMIN — PROPOFOL 50 MG: 10 INJECTION, EMULSION INTRAVENOUS at 08:46

## 2025-06-10 RX ADMIN — LIDOCAINE HYDROCHLORIDE 50 MG: 10 INJECTION, SOLUTION EPIDURAL; INFILTRATION; INTRACAUDAL; PERINEURAL at 08:44

## 2025-06-10 RX ADMIN — SODIUM CHLORIDE, POTASSIUM CHLORIDE, SODIUM LACTATE AND CALCIUM CHLORIDE: 600; 310; 30; 20 INJECTION, SOLUTION INTRAVENOUS at 07:53

## 2025-06-10 RX ADMIN — PROPOFOL 150 MCG/KG/MIN: 10 INJECTION, EMULSION INTRAVENOUS at 08:45

## 2025-06-10 RX ADMIN — PROPOFOL 50 MG: 10 INJECTION, EMULSION INTRAVENOUS at 08:44

## 2025-06-10 ASSESSMENT — ENCOUNTER SYMPTOMS
SHORTNESS OF BREATH: 1
EYES NEGATIVE: 1
WHEEZING: 1
BACK PAIN: 1
COUGH: 1
CONSTIPATION: 1

## 2025-06-10 ASSESSMENT — PAIN - FUNCTIONAL ASSESSMENT
PAIN_FUNCTIONAL_ASSESSMENT: 0-10
PAIN_FUNCTIONAL_ASSESSMENT: NONE - DENIES PAIN

## 2025-06-10 ASSESSMENT — PAIN DESCRIPTION - DESCRIPTORS: DESCRIPTORS: ACHING

## 2025-06-10 NOTE — TELEPHONE ENCOUNTER
of stroke     Chronic right-sided low back pain with right-sided sciatica     Paroxysmal A-fib (HCC)     Migraine with aura and without status migrainosus, not intractable     Primary hypertension     Lumbar radiculopathy     Chronic use of opiate for therapeutic purpose     Class 1 obesity with body mass index (BMI) of 34.0 to 34.9 in adult     Myofascial pain syndrome     Lumbosacral spondylosis without myelopathy     Colon cancer screening

## 2025-06-10 NOTE — H&P
HISTORY and PHYSICAL  [facility]       NAME:  Pedro Whitfield  MRN: [id number]   YOB: 1960   Date: 6/10/2025   Age: 64 y.o.  Gender: male       COMPLAINT AND PRESENT HISTORY:     Pedro Whitfield is 64 y.o.,  male, presents for COLORECTAL CANCER SCREENING, NOT HIGH RISK     Primary dx: Colon cancer screening [Z12.11].  Hpi:    Pedro Whitfield is 64 y.o.,   male, having a Screening Colonoscopy. Prior Colonoscopy done 5 years ago.   Patient has hx of Colon Polyps.  Patient is s/p Colon Surgery.   Patient has positive FH of Colon Cancer in his Mother .    Patient reports  changes in bowel habits. Pt has chronic constipation due to pain medication , pt taking stool softener from OTC,  No GI /Rectal bleeding, experiencing red/ black/ BRBPR stools.    Patient denies any other GI symptoms. No nausea / vomiting.  No abdominal pains or cramping. No heartburn or dysphagia. No fever or chills, chest pain or SOB.   Prep fully completed: yes . Pt reports his last BM is clear liquid     Review of additional significant medical hx:  (See chart for additional detail, including current medications /see ROS for current S/S):     NPO status: pt NPO since the past midnight   Medications taken TODAY (with sip of water): pt took his BP medication with sip of water   Anticoagulation status: none    Denies personal hx of blood clots.  Denies personal hx of MRSA infection.  Denies any personal or family hx of previous complications w/anesthesia.    RECENT IMAGING R/T HPI   No results found.    RECENT LABS, IMAGING AND TESTING     No results found for: \"WBC\", \"RBC\", \"HGB\", \"HCT\", \"MCV\", \"MCH\", \"MCHC\", \"RDW\", \"PLT\", \"MPV\"     No results found for: \"NA\", \"K\", \"CL\", \"CO2\", \"BUN\", \"CREATININE\", \"GLUCOSE\", \"CALCIUM\", \"LABALBU\", \"BILITOT\", \"ALKPHOS\", \"AST\", \"ALT\"    PAST MEDICAL HISTORY     Past Medical History:   Diagnosis Date    Cerebral artery occlusion with cerebral infarction (HCC)     Headache

## 2025-06-10 NOTE — ANESTHESIA PRE PROCEDURE
Once nightly 11/14/24  Yes Earnestine Harris MD   cetirizine (ZYRTEC) 10 MG tablet Take 1 tablet by mouth daily 12/18/24  Yes Earnestine Harris MD   vitamin D3 (CHOLECALCIFEROL) 25 MCG (1000 UT) TABS tablet Take 1 tablet by mouth daily 12/26/24  Yes Earnestine Harris MD   escitalopram (LEXAPRO) 20 MG tablet Take 1 tablet by mouth daily 12/26/24  Yes Earnestine Harris MD   fluticasone (FLONASE) 50 MCG/ACT nasal spray INSTILL 1 SPRAY INTO EACH NOSTRIL EVERY DAY 12/2/24  Yes Earnestine Harris MD   LORazepam (ATIVAN) 1 MG tablet Take 1 tablet by mouth 2 times daily as needed for Anxiety. 12/12/24  Yes Earnestine Harris MD   montelukast (SINGULAIR) 10 MG tablet Take 1 tablet by mouth nightly 12/26/24  Yes Earnestine Harris MD   omeprazole (PRILOSEC) 40 MG delayed release capsule TAKE ONE CAPSULE BY MOUTH EVERY DAY BEFORE BREAKFAST 12/26/24  Yes Earnestine Harris MD   tamsulosin (FLOMAX) 0.4 MG capsule TAKE ONE CAPSULE BY MOUTH EVERY DAY half hour BEFORE meal 12/26/24  Yes Earnestine Harris MD   SPIRIVA RESPIMAT 2.5 MCG/ACT AERS inhaler INHALE TWO PUFFS BY MOUTH EVERY DAY 12/2/24  Yes Earnestine Harris MD   dilTIAZem (CARDIZEM CD) 120 MG extended release capsule Take 1 capsule by mouth daily 12/27/24  Yes Leeann Ramos MD   rimegepant sulfate (NURTEC) 75 MG TBDP Take 75 mg by mouth daily as needed (migraine)  Patient not taking: Reported on 6/10/2025 4/28/25   Gaby Romero PA   Blood Pressure KIT Take Blood Pressure Twice Daily 3/4/25   Mal Barksdale MD   propranolol (INDERAL) 40 MG tablet Take 1 tablet by mouth 2 times daily 3/1/25   Mal Barksdale MD   disulfiram (ANTABUSE) 250 MG tablet Take 1 tablet by mouth daily  Patient not taking: Reported on 6/10/2025 12/3/24   Earnestine Harris MD   traZODone (DESYREL) 150 MG tablet 150mg halved daily 12/26/24   Provider, MD Julien Colbyumab-vfrm (AJOVY) 225 MG/1.5ML SOAJ Inject 225 mg into the skin

## 2025-06-10 NOTE — OP NOTE
COLONOSCOPY    DATE OF PROCEDURE: 6/10/2025    SURGEON: Daniel Maria MD  Facility : [facility]  ASSISTANT: None  PREOPERATIVE DIAGNOSIS: Screening colonoscopy, family history of colon cancer in mother, personal history of polyps    POSTOPERATIVE DIAGNOSIS: as described below    OPERATION: Total colonoscopy with biopsy and snare polypectomy    ANESTHESIA: Monitored Anesthesia Care (MAC)    ESTIMATED BLOOD LOSS: less than 50 cc    COMPLICATIONS: None.     SPECIMENS:  Was Obtained:     ID Type Source Tests Collected by Time Destination   A : TRANSVERSE POLYPS Tissue Colon-Transverse SURGICAL PATHOLOGY Daniel Maria MD 6/10/2025 0854    B : SIGMOID POLYP Tissue Sigmoid Colon SURGICAL PATHOLOGY Daniel Maria MD 6/10/2025 0924    C : RECTAL POLYP Tissue Rectum SURGICAL PATHOLOGY Daniel Maria MD 6/10/2025 0925         HISTORY: The patient is a 64 y.o. year old male with history of above preop diagnosis.  I recommended colonoscopy with possible biopsy or polypectomy and I explained the risk, benefits, expected outcome, and alternatives to the procedure.  Risks included but are not limited to medication allergy, medication reaction, cardiovascular and respiratory problems, bleeding, perforation, infection, and/or missed diagnosis.  The patient understands and is in agreement.        PROCEDURE: Following arrival in the endoscopy room, the patient was placed in the left lateral decubitus position and final time-out accomplished in the presence of the nursing staff. Baseline vital signs were obtained and reviewed. The patient was given IV Monitored Anesthesia Care (MAC) and vitals monitoring per anesthesia department.     Digital rectal exam- normal    The colonoscope was inserted per rectum and advanced under direct vision to the cecum without difficulty.  Photodocumentation of the maximal extent reached (cecum, appendiceal orifice, ileocecal valve, and terminal ileum if indicated) and other findings

## 2025-06-10 NOTE — ANESTHESIA POSTPROCEDURE EVALUATION
Department of Anesthesiology  Postprocedure Note    Patient: Pedro Whitfield  MRN: [id number]  YOB: 1960  Date of evaluation: 6/10/2025    Procedure Summary       Date: 06/10/25 Room / Location: [place] [address] / [facility]    Anesthesia Start: 0841 Anesthesia Stop: 0933    Procedure: COLONOSCOPY POLYPECTOMY SNARE/BIOPSY (Rectum) Diagnosis:       Colon cancer screening      (Colon cancer screening [Z12.11])    Surgeons: Daniel Maria MD Responsible Provider: Constanza Esteban MD    Anesthesia Type: general ASA Status: 3            Anesthesia Type: No value filed.    Taty Phase I: Taty Score: 10    Taty Phase II: Taty Score: 10    Anesthesia Post Evaluation    Comments: POST- ANESTHESIA EVALUATION       Pt Name: Pedro Whitfield  MRN: [id number]  YOB: 1960  Date of evaluation: 6/10/2025  Time:  10:34 AM      BP (!) 149/93  Pulse 63   Temp 100 °F (37.8 °C)   Resp 11   Ht 1.905 m (6' 3\")   Wt 129.3 kg (285 lb)   SpO2 97%   BMI 35.62 kg/m²      Consciousness Level  Awake  Cardiopulmonary Status  Stable  Pain Adequately Treated YES  Nausea / Vomiting  NO  Adequate Hydration  YES  Anesthesia Related Complications NONE      Electronically signed by Constanza Esteban MD on 6/10/2025 at 10:34 AM           No notable events documented.

## 2025-06-10 NOTE — DISCHARGE INSTRUCTIONS
Sedation or General Anesthesia, Adult  Care After  Refer to this sheet in the next 24 hours. These instructions provide you with information on caring for yourself after your procedure. Your caregiver may also give you more specific instructions. Your treatment has been planned according to current medical practices, but problems sometimes occur. Call your caregiver if you have any problems or questions after your procedure.   HOME CARE INSTRUCTIONS   Do not participate in any activities that require you to be alert or coordinated. Do not:  Drive.  Swim.  Ride a bicycle.  Operate heavy machinery.  Cook.  Use power tools.  Climb ladders.  Work at heights.  Take a bath.  Do not drink alcohol.  Do not make any important decisions or sign legal documents.  Stay with an adult.  The first meal following your procedure should be light and small. Avoid solid foods if you feel sick to your stomach (nauseous) or if you throw up (vomit).  Drink enough fluids to keep your urine clear or pale yellow.  Only take your usual medicines or new medicines if your caregiver approves them.  Only take over-the-counter or prescription medicines for pain, discomfort, or fever as directed by your caregiver.  Keep all follow-up appointments as directed by your caregiver.  SEEK IMMEDIATE MEDICAL CARE IF:   You are not feeling normal or behaving normally after 24 hours.  You have persistent nausea and vomiting.  You are unable to drink fluids or eat food.  You have difficulty urinating.  You have difficulty breathing or speaking.  You have blue or gray skin.  There is difficulty waking or you cannot be woken up.  You have heavy bleeding, redness, or a lot of swelling where the sedative or anesthesia entered your skin (intravenous site).  You have a rash.  MAKE SURE YOU:  Understand these instructions.  Will watch your condition.  Will get help right away if you are not doing well or get worse.  Document Released: 12/18/2006 Document Revised:  alone your physician may also recommend psyllium or methylcellulose as well.  If your physician has placed you on an antibiotic it is critical that you take the full course of these, even if your symptoms have improved, and that you not miss any doses.    QUESTIONS:  Please feel free to call your physician or the hospital  if you have any questions, and they will be glad to assist you.  If you have further questions it may also be helpful to meet with a dietitician.    High-Fiber Diet     What Is Fiber?   Dietary fiber is a form of carbohydrate found in plants that cannot be digested by humans. All plants contain fiber, including fruits, vegetables, grains, and legumes. Fiber is often classified into two categories: soluble and insoluble.   Soluble fiber draws water into the bowel and can help slow digestion. Examples of foods that are high in soluble fiber include oatmeal, oat bran, barley, legumes (eg, beans and peas), apples, and strawberries.   Insoluble fiber speeds digestion and can add bulk to the stool. Examples of foods that are high in insoluble fiber include whole-wheat products, wheat bran, cauliflower, green beans, and potatoes.   Why Follow a High-Fiber Diet?   A high-fiber diet is often recommended to prevent and treat constipation , hemorrhoids , diverticulitis , and irritable bowel syndrome . Eating a high-fiber diet can also help improve your cholesterol levels, lower your risk of coronary heart disease , reduce your risk of type 2 diabetes , and lower your weight. For people with type 1 or 2 diabetes, a high-fiber diet can also help stabilize blood sugar levels.   How Much Fiber Should I Eat?   A high-fiber diet should contain  20-35 grams  of fiber a day. This is actually the amount recommended for the general adult population; however, most Americans eat only 15 grams of fiber per day.   Digestion of Fiber   Eating a higher fiber diet than usual can take some getting used to by your

## 2025-06-12 LAB — SURGICAL PATHOLOGY REPORT: NORMAL

## 2025-06-16 ENCOUNTER — HOSPITAL ENCOUNTER (OUTPATIENT)
Dept: PAIN MANAGEMENT | Age: 65
Discharge: HOME | End: 2025-06-16
Payer: MEDICARE

## 2025-06-16 VITALS — BODY MASS INDEX: 35.43 KG/M2 | HEIGHT: 75 IN | WEIGHT: 285 LBS

## 2025-06-16 DIAGNOSIS — M54.41 CHRONIC RIGHT-SIDED LOW BACK PAIN WITH RIGHT-SIDED SCIATICA: ICD-10-CM

## 2025-06-16 DIAGNOSIS — M47.817 LUMBOSACRAL SPONDYLOSIS WITHOUT MYELOPATHY: Primary | ICD-10-CM

## 2025-06-16 DIAGNOSIS — Z79.891 CHRONIC USE OF OPIATE FOR THERAPEUTIC PURPOSE: ICD-10-CM

## 2025-06-16 DIAGNOSIS — M54.16 LUMBAR RADICULOPATHY: ICD-10-CM

## 2025-06-16 DIAGNOSIS — G89.29 CHRONIC RIGHT-SIDED LOW BACK PAIN WITH RIGHT-SIDED SCIATICA: ICD-10-CM

## 2025-06-16 PROCEDURE — 80307 DRUG TEST PRSMV CHEM ANLYZR: CPT

## 2025-06-16 PROCEDURE — G0480 DRUG TEST DEF 1-7 CLASSES: HCPCS

## 2025-06-16 PROCEDURE — 99213 OFFICE O/P EST LOW 20 MIN: CPT

## 2025-06-16 PROCEDURE — 99214 OFFICE O/P EST MOD 30 MIN: CPT | Performed by: NURSE PRACTITIONER

## 2025-06-16 ASSESSMENT — ENCOUNTER SYMPTOMS
SHORTNESS OF BREATH: 0
BACK PAIN: 1
COUGH: 0
BOWEL INCONTINENCE: 0
CONSTIPATION: 0

## 2025-06-16 ASSESSMENT — PAIN SCALES - GENERAL: PAINLEVEL_OUTOF10: 6

## 2025-06-16 NOTE — PROGRESS NOTES
Chief Complaint   Patient presents with    Back Pain    Medication Refill     Percocet          [facility]     Pt complains of back pain. Lumbar MRI ordered 2/3/25 but not scheduled. Pt had injections at another pain clinic in the past with some relief. He has tried PT with no relief. He is taking percocet 5/325 TID with some relief. He is also taking lorazepam. Dr. Jean-Baptiste has discussed tapering this at last two visits but pt refilled the prescription again 3/3/25. Discussed again at last visit and he agreed to not refill this however, he did  another prescription of ativan on 4/28. He states he refilled this due to being ill but will not fill this again. Did discuss with him that if he does refill it, we will discontinue the percocet. He did again refill the prescription 6/4/25. He states the prescription was delivered to him and he did not take any of it. I asked if he brought it with him and he states he did not. At this point, he would have to have him bring the full bottle in order for us to count them and dispose them to continue prescribing opioids. He states he can bring them tomorrow.      Patient was warned of the risk of taking a Benzodiazepine along with an Opioid. Risk of respiratory depression and or death.  Patient was advised to talk with the primary care provider who prescribes their benzodiazepine to let them know they are on an opioid for pain and to discuss with them if an alternate medication could be prescribed. He states he will work on weaning off. The goal will be to have him completely off in two months. He is agreeable to this.      Back Pain  This is a chronic problem. The current episode started more than 1 year ago. The problem occurs constantly. The problem is unchanged. The pain is present in the lumbar spine. The quality of the pain is described as aching and shooting. The pain radiates to the right foot, right knee and right thigh. The pain is at a severity of 6/10. The

## 2025-06-20 LAB
6-ACETYLMORPHINE, UR: NOT DETECTED
7-AMINOCLONAZEPAM, URINE: NOT DETECTED
ALPHA-OH-ALPRAZ, URINE: NOT DETECTED
ALPHA-OH-MIDAZOLAM, URINE: NOT DETECTED
ALPRAZOLAM, URINE: NOT DETECTED
AMPHETAMINE, URINE: NOT DETECTED
BARBITURATES, URINE: NEGATIVE
BENZOYLECGONINE, UR: NEGATIVE
BUPRENORPHINE URINE: NOT DETECTED
CARISOPRODOL, URINE: NEGATIVE
CLONAZEPAM, URINE: NOT DETECTED
CODEINE, URINE: NOT DETECTED
CREAT UR-MCNC: 139.2 MG/DL (ref 20–400)
DIAZEPAM, URINE: NOT DETECTED
DRUGS EXPECTED, UR: NORMAL
EER HI RES INTERP UR: NORMAL
ETHYL GLUCURONIDE UR: NEGATIVE
FENTANYL URINE: NOT DETECTED
GABAPENTIN: NOT DETECTED
HYDROCODONE, URINE: NOT DETECTED
HYDROMORPHONE, URINE: NOT DETECTED
LORAZEPAM, URINE: PRESENT
MARIJUANA METAB, UR: NEGATIVE
MDA, URINE: NOT DETECTED
MDEA, EVE, UR: NOT DETECTED
MDMA, URINE: NOT DETECTED
MEPERIDINE METAB, UR: NOT DETECTED
METHADONE, URINE: NEGATIVE
METHAMPHETAMINE, URINE: NOT DETECTED
METHYLPHENIDATE: NOT DETECTED
MIDAZOLAM, URINE: NOT DETECTED
MORPHINE, OPI1M: NOT DETECTED
NALOXONE URINE: NOT DETECTED
NORBUPRENORPHINE, URINE: NOT DETECTED
NORDIAZEPAM, URINE: NOT DETECTED
NORFENTANYL, URINE: NOT DETECTED
NORHYDROCODONE, URINE: NOT DETECTED
NOROXYCODONE, URINE: PRESENT
NOROXYMORPHONE, URINE: NOT DETECTED
OXAZEPAM, URINE: NOT DETECTED
OXYCODONE URINE: PRESENT
OXYMORPHONE, URINE: PRESENT
PAIN MANAGEMENT DRUG PANEL INTERP, URINE: NORMAL
PAIN MGT DRUG PANEL, HI RES, UR: NORMAL
PCP,URINE: NEGATIVE
PHENTERMINE, UR: NOT DETECTED
PREGABALIN: NOT DETECTED
TAPENTADOL, URINE: NOT DETECTED
TAPENTADOL-O-SULFATE, URINE: NOT DETECTED
TEMAZEPAM, URINE: NOT DETECTED
TRAMADOL, URINE: NEGATIVE
ZOLPIDEM METABOLITE (ZCA), URINE: NOT DETECTED
ZOLPIDEM, URINE: NOT DETECTED

## 2025-07-06 PROBLEM — Z12.11 COLON CANCER SCREENING: Status: RESOLVED | Noted: 2025-06-06 | Resolved: 2025-07-06

## 2025-07-21 RX ORDER — FREMANEZUMAB-VFRM 225 MG/1.5ML
INJECTION SUBCUTANEOUS
Qty: 1.5 ML | Refills: 5 | Status: SHIPPED | OUTPATIENT
Start: 2025-07-21

## 2025-07-21 NOTE — TELEPHONE ENCOUNTER
Pharmacy requesting refill of Ajovy 225 mg.      Medication active on med list yes      Date of last Rx: 12/30/2024 with 5 refills          verified by DYLAN, SELINA      Date of last appointment 4/17/2025    Next Visit Date:  8/27/2025

## 2025-08-27 ENCOUNTER — OFFICE VISIT (OUTPATIENT)
Dept: NEUROLOGY | Age: 65
End: 2025-08-27
Payer: MEDICARE

## 2025-08-27 VITALS
BODY MASS INDEX: 34.54 KG/M2 | WEIGHT: 277.8 LBS | HEIGHT: 75 IN | DIASTOLIC BLOOD PRESSURE: 81 MMHG | HEART RATE: 47 BPM | SYSTOLIC BLOOD PRESSURE: 128 MMHG

## 2025-08-27 DIAGNOSIS — G43.011 INTRACTABLE MIGRAINE WITHOUT AURA WITH STATUS MIGRAINOSUS: Primary | ICD-10-CM

## 2025-08-27 DIAGNOSIS — R00.2 PALPITATIONS: ICD-10-CM

## 2025-08-27 DIAGNOSIS — Z86.73 HISTORY OF STROKE: ICD-10-CM

## 2025-08-27 PROCEDURE — 3079F DIAST BP 80-89 MM HG: CPT | Performed by: PHYSICIAN ASSISTANT

## 2025-08-27 PROCEDURE — 99214 OFFICE O/P EST MOD 30 MIN: CPT | Performed by: PHYSICIAN ASSISTANT

## 2025-08-27 PROCEDURE — 3074F SYST BP LT 130 MM HG: CPT | Performed by: PHYSICIAN ASSISTANT

## 2025-08-27 RX ORDER — CELECOXIB 200 MG/1
CAPSULE ORAL
COMMUNITY
Start: 2025-08-18

## 2025-08-27 RX ORDER — GABAPENTIN 300 MG/1
CAPSULE ORAL
COMMUNITY
Start: 2025-08-18

## 2025-08-27 RX ORDER — DILTIAZEM HYDROCHLORIDE 360 MG/1
360 CAPSULE, EXTENDED RELEASE ORAL DAILY
COMMUNITY
Start: 2025-06-27

## 2025-08-27 RX ORDER — ROSUVASTATIN CALCIUM 5 MG
5 TABLET ORAL
COMMUNITY

## 2025-08-27 RX ORDER — NEOMYCIN SULFATE, POLYMYXIN B SULFATE, HYDROCORTISONE 3.5; 10000; 1 MG/ML; [USP'U]/ML; MG/ML
SOLUTION/ DROPS AURICULAR (OTIC)
COMMUNITY
Start: 2025-08-20

## (undated) DEVICE — FORCEPS BX L240CM JAW DIA2.8MM L CAP W/ NDL MIC MESH TOOTH

## (undated) DEVICE — GLOVE ORANGE PI 8 1/2   MSG9085

## (undated) DEVICE — GOWN,POLY REINFORCED,LG: Brand: MEDLINE

## (undated) DEVICE — GAUZE,SPONGE,4"X4",16PLY,STRL,LF,10/TRAY: Brand: MEDLINE

## (undated) DEVICE — ENDOSCOPIC KIT 1.1+ 10 FT OP4 CA DE

## (undated) DEVICE — SNARE ENDOSCP L240CM OD24MM LOOP W10MM RND INSUL STIFF BRAID

## (undated) DEVICE — POLYP TRAP: Brand: TRAPEASE®